# Patient Record
Sex: FEMALE | Race: WHITE | NOT HISPANIC OR LATINO | URBAN - METROPOLITAN AREA
[De-identification: names, ages, dates, MRNs, and addresses within clinical notes are randomized per-mention and may not be internally consistent; named-entity substitution may affect disease eponyms.]

---

## 2023-07-26 ENCOUNTER — OFFICE VISIT (OUTPATIENT)
Dept: URGENT CARE | Facility: CLINIC | Age: 23
End: 2023-07-26
Payer: OTHER GOVERNMENT

## 2023-07-26 VITALS
WEIGHT: 110 LBS | HEIGHT: 64 IN | RESPIRATION RATE: 16 BRPM | BODY MASS INDEX: 18.78 KG/M2 | SYSTOLIC BLOOD PRESSURE: 122 MMHG | TEMPERATURE: 98 F | HEART RATE: 89 BPM | OXYGEN SATURATION: 99 % | DIASTOLIC BLOOD PRESSURE: 88 MMHG

## 2023-07-26 DIAGNOSIS — R01.1 HEART MURMUR: ICD-10-CM

## 2023-07-26 DIAGNOSIS — R06.02 SHORTNESS OF BREATH: Primary | ICD-10-CM

## 2023-07-26 DIAGNOSIS — R42 LIGHTHEADEDNESS: ICD-10-CM

## 2023-07-26 DIAGNOSIS — R53.83 FATIGUE, UNSPECIFIED TYPE: ICD-10-CM

## 2023-07-26 LAB
ALBUMIN SERPL BCP-MCNC: 4.8 G/DL (ref 3.5–5.2)
ALP SERPL-CCNC: 54 U/L (ref 55–135)
ALT SERPL W/O P-5'-P-CCNC: 10 U/L (ref 10–44)
ANION GAP SERPL CALC-SCNC: 16 MMOL/L (ref 8–16)
AST SERPL-CCNC: 19 U/L (ref 10–40)
BASOPHILS # BLD AUTO: 0.04 K/UL (ref 0–0.2)
BASOPHILS NFR BLD: 0.4 % (ref 0–1.9)
BILIRUB SERPL-MCNC: 0.9 MG/DL (ref 0.1–1)
BUN SERPL-MCNC: 12 MG/DL (ref 6–20)
CALCIUM SERPL-MCNC: 9.5 MG/DL (ref 8.7–10.5)
CHLORIDE SERPL-SCNC: 103 MMOL/L (ref 95–110)
CO2 SERPL-SCNC: 20 MMOL/L (ref 23–29)
CREAT SERPL-MCNC: 0.8 MG/DL (ref 0.5–1.4)
DIFFERENTIAL METHOD: ABNORMAL
EOSINOPHIL # BLD AUTO: 0.1 K/UL (ref 0–0.5)
EOSINOPHIL NFR BLD: 0.5 % (ref 0–8)
ERYTHROCYTE [DISTWIDTH] IN BLOOD BY AUTOMATED COUNT: 11.9 % (ref 11.5–14.5)
EST. GFR  (NO RACE VARIABLE): >60 ML/MIN/1.73 M^2
GLUCOSE SERPL-MCNC: 79 MG/DL (ref 70–110)
HCT VFR BLD AUTO: 42 % (ref 37–48.5)
HGB BLD-MCNC: 15.2 G/DL (ref 12–16)
IMM GRANULOCYTES # BLD AUTO: 0.02 K/UL (ref 0–0.04)
IMM GRANULOCYTES NFR BLD AUTO: 0.2 % (ref 0–0.5)
LYMPHOCYTES # BLD AUTO: 2.1 K/UL (ref 1–4.8)
LYMPHOCYTES NFR BLD: 22.8 % (ref 18–48)
MCH RBC QN AUTO: 32.3 PG (ref 27–31)
MCHC RBC AUTO-ENTMCNC: 36.2 G/DL (ref 32–36)
MCV RBC AUTO: 89 FL (ref 82–98)
MONOCYTES # BLD AUTO: 0.6 K/UL (ref 0.3–1)
MONOCYTES NFR BLD: 6.6 % (ref 4–15)
NEUTROPHILS # BLD AUTO: 6.5 K/UL (ref 1.8–7.7)
NEUTROPHILS NFR BLD: 69.5 % (ref 38–73)
NRBC BLD-RTO: 0 /100 WBC
PLATELET # BLD AUTO: 296 K/UL (ref 150–450)
PMV BLD AUTO: 10.8 FL (ref 9.2–12.9)
POTASSIUM SERPL-SCNC: 3.3 MMOL/L (ref 3.5–5.1)
PROT SERPL-MCNC: 8.1 G/DL (ref 6–8.4)
RBC # BLD AUTO: 4.7 M/UL (ref 4–5.4)
SODIUM SERPL-SCNC: 139 MMOL/L (ref 136–145)
TSH SERPL DL<=0.005 MIU/L-ACNC: 1.56 UIU/ML (ref 0.4–4)
WBC # BLD AUTO: 9.37 K/UL (ref 3.9–12.7)

## 2023-07-26 PROCEDURE — 99214 OFFICE O/P EST MOD 30 MIN: CPT | Mod: S$GLB,,, | Performed by: FAMILY MEDICINE

## 2023-07-26 PROCEDURE — 93005 ELECTROCARDIOGRAM TRACING: CPT | Mod: S$GLB,,, | Performed by: FAMILY MEDICINE

## 2023-07-26 PROCEDURE — 36415 COLL VENOUS BLD VENIPUNCTURE: CPT | Performed by: FAMILY MEDICINE

## 2023-07-26 PROCEDURE — 85025 COMPLETE CBC W/AUTO DIFF WBC: CPT | Performed by: FAMILY MEDICINE

## 2023-07-26 PROCEDURE — 93010 ELECTROCARDIOGRAM REPORT: CPT | Mod: S$PBB,,, | Performed by: INTERNAL MEDICINE

## 2023-07-26 PROCEDURE — 93010 EKG 12-LEAD: ICD-10-PCS | Mod: S$PBB,,, | Performed by: INTERNAL MEDICINE

## 2023-07-26 PROCEDURE — 93005 EKG 12-LEAD: ICD-10-PCS | Mod: S$GLB,,, | Performed by: FAMILY MEDICINE

## 2023-07-26 PROCEDURE — 99214 PR OFFICE/OUTPT VISIT, EST, LEVL IV, 30-39 MIN: ICD-10-PCS | Mod: S$GLB,,, | Performed by: FAMILY MEDICINE

## 2023-07-26 PROCEDURE — 71046 XR CHEST PA AND LATERAL: ICD-10-PCS | Mod: S$GLB,,, | Performed by: RADIOLOGY

## 2023-07-26 PROCEDURE — 71046 X-RAY EXAM CHEST 2 VIEWS: CPT | Mod: S$GLB,,, | Performed by: RADIOLOGY

## 2023-07-26 PROCEDURE — 84443 ASSAY THYROID STIM HORMONE: CPT | Performed by: FAMILY MEDICINE

## 2023-07-26 PROCEDURE — 80053 COMPREHEN METABOLIC PANEL: CPT | Performed by: FAMILY MEDICINE

## 2023-07-26 NOTE — PROGRESS NOTES
"Subjective:      Patient ID: Rosalinda Walter is a 22 y.o. female.    Vitals:  height is 5' 4" (1.626 m) and weight is 49.9 kg (110 lb). Her oral temperature is 98.4 °F (36.9 °C). Her blood pressure is 132/89 and her pulse is 97. Her respiration is 16 and oxygen saturation is 99%.     Chief Complaint: Fatigue    Patient presents with c.o fatigue x 4 weeks or more. Patient went to a different  and was placed on Augmentin(told she might have an ear infection). She reports no improvement with med. She returned the urgent care and was placed on prednisone(even though ear wasn't red). She reports worsening of sxs and now has a headache. No fever. She is short of breath with minimal activity. 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 She has to stop going up the steps because she gets lightheaded and dizzy. She denies ear pain, sore throat, coughing, denies chest pain, denies abdominal pain. Denies dysuria or urine frequency or urgency. Denies change in her bowels , denies dark or tarry stools, denies fever or chills, denies heavy menses.     Fatigue  This is a new problem. Episode onset: 2 weeks. The problem occurs constantly. The problem has been unchanged. Associated symptoms include fatigue. Treatments tried: augmentin. The treatment provided no relief.     Constitution: Positive for fatigue.    Objective:     Physical Exam   Constitutional: She is oriented to person, place, and time. She appears well-developed. She is cooperative. She appears ill (patient appears fatigued). No distress.   HENT:   Head: Normocephalic and atraumatic.   Ears:   Right Ear: Hearing, tympanic membrane, external ear and ear canal normal.   Left Ear: Hearing, tympanic membrane, external ear and ear canal normal.   Nose: Nose normal. No mucosal edema or nasal deformity. No epistaxis. Right sinus exhibits no maxillary sinus tenderness and no frontal sinus tenderness. " Left sinus exhibits no maxillary sinus tenderness and no frontal sinus tenderness.   Mouth/Throat: Uvula is midline, oropharynx is clear and moist and mucous membranes are normal. Mucous membranes are moist. No trismus in the jaw. Normal dentition. No uvula swelling. Oropharynx is clear.   Eyes: Conjunctivae and lids are normal.   Neck: Trachea normal and phonation normal. Neck supple.   Cardiovascular: Normal rate, regular rhythm and normal pulses.   Murmur (gr 3/6 FELICITAS heard at right sternal border) heard.  Pulmonary/Chest: Effort normal and breath sounds normal.   Abdominal: Normal appearance and bowel sounds are normal. Soft.   Musculoskeletal: Normal range of motion.         General: Normal range of motion.   Lymphadenopathy:     She has no cervical adenopathy.   Neurological: no focal deficit. She is alert and oriented to person, place, and time. She displays weakness (generalized). No cranial nerve deficit. She exhibits normal muscle tone. Coordination normal.   Skin: Skin is warm, dry, intact, pale and no rash. Capillary refill takes less than 2 seconds.   Psychiatric: Her speech is normal and behavior is normal. Judgment and thought content normal.   Nursing note and vitals reviewed.    Assessment:     1. Shortness of breath    2. Lightheadedness    3. Heart murmur    4. Fatigue, unspecified type        Plan:   EKG - no acute findings, NSR  Chest xray- no acute findings    Shortness of breath  -     IN OFFICE EKG 12-LEAD (to [place])  -     XR CHEST PA AND LATERAL; Future; Expected date: 07/26/2023  -     CBC Auto Differential  -     COMPREHENSIVE METABOLIC PANEL  -     TSH  -     Ambulatory referral/consult to Internal Medicine    Lightheadedness  -     Ambulatory referral/consult to Internal Medicine    Heart murmur  -     Echo Saline Bubble? No; Future  -     Ambulatory referral/consult to Internal Medicine    Fatigue, unspecified type  -     Ambulatory referral/consult to Internal Medicine      I advised pt  I will provide a note to be off work until we can get her labs done. If those are normal I recommend she follow with Internal Medicine specialist for further testing if symptoms persist.     Pt or guardian provided educational materials and instructions regarding their visit diagnosis.

## 2023-07-26 NOTE — PATIENT INSTRUCTIONS
Rest, Increase fluids,   I provided a note to be off work until we can get her labs done.   If those are normal I recommend you  follow with Internal Medicine specialist for further testing if symptoms persist.

## 2023-07-26 NOTE — LETTER
Urgent Care - [facility]  Urgent Care  [address] [place] [id number]  Phone: [phone]  Fax: [phone] July 26, 2023    Patient: Rosalinda Walter   Patient ID [id number]   YOB: 2000   Date of Visit: 7/26/2023       To Whom It May Concern:    Rosalinda Walter was seen and treated in our emergency department on 7/26/2023 for shortness of breath. She may return to work on 07/31/23 .     Sincerely,       Angelika Lorenzo, DO

## 2023-07-28 ENCOUNTER — TELEPHONE (OUTPATIENT)
Dept: URGENT CARE | Facility: CLINIC | Age: 23
End: 2023-07-28
Payer: OTHER GOVERNMENT

## 2023-07-28 NOTE — TELEPHONE ENCOUNTER
No answer. Left message on answering machine for pt to call back for test results. Labs mostly normal- just minor signs of dehydration and possible hyperventilation. Her CBC was ok and tsh ok. I do believe she should follow with PCP to get established and for further testing if symptoms persist. I explained I dont see an obvious reason on her labs that explain her symptoms. If she feels worse she should go in to the ED. If she is stable but just isnt better than rtc here if symptoms persist. She should follow with PCP as that is scheduled and for echo when she can

## 2023-07-28 NOTE — TELEPHONE ENCOUNTER
I spoke with patient today regarding her lab results. They did show she has a mildly low potassium (pt denies nausea or vomiting or taking diuretics) and a mildly low CO2. She denies taking any meds. She is still feeling lightheaded and fatigued. I had referred her to an Internal Medicine specialist but(the appointment is not till the end of August) and  I realize her insurance is  and she may have to go to a specific provider and/or get a referral from them in order for them to cover the echocardiogram or additional testing.. I recommend she check with  about those rules. I also explained that if she feels like symptoms are worsening I advise her to go the the ED where they can do more extensive testing than we can in the UC. Pt agrees to this plan

## 2023-07-28 NOTE — TELEPHONE ENCOUNTER
Potassium is a bit low and I would recommend a high potassium food diet( lots of dried fruit, bananas, avacodo's, etc

## 2023-08-17 ENCOUNTER — OFFICE VISIT (OUTPATIENT)
Dept: INTERNAL MEDICINE | Facility: CLINIC | Age: 23
End: 2023-08-17
Payer: OTHER GOVERNMENT

## 2023-08-17 ENCOUNTER — LAB VISIT (OUTPATIENT)
Dept: LAB | Facility: HOSPITAL | Age: 23
End: 2023-08-17
Payer: OTHER GOVERNMENT

## 2023-08-17 VITALS
DIASTOLIC BLOOD PRESSURE: 82 MMHG | WEIGHT: 111.13 LBS | HEART RATE: 108 BPM | HEIGHT: 64 IN | SYSTOLIC BLOOD PRESSURE: 124 MMHG | BODY MASS INDEX: 18.97 KG/M2 | OXYGEN SATURATION: 100 %

## 2023-08-17 DIAGNOSIS — R42 DIZZINESS: ICD-10-CM

## 2023-08-17 DIAGNOSIS — R42 DIZZINESS: Primary | ICD-10-CM

## 2023-08-17 DIAGNOSIS — R00.2 PALPITATIONS: ICD-10-CM

## 2023-08-17 LAB
ANION GAP SERPL CALC-SCNC: 8 MMOL/L (ref 8–16)
BUN SERPL-MCNC: 13 MG/DL (ref 6–20)
CALCIUM SERPL-MCNC: 9.2 MG/DL (ref 8.7–10.5)
CHLORIDE SERPL-SCNC: 106 MMOL/L (ref 95–110)
CO2 SERPL-SCNC: 24 MMOL/L (ref 23–29)
CREAT SERPL-MCNC: 0.8 MG/DL (ref 0.5–1.4)
EST. GFR  (NO RACE VARIABLE): >60 ML/MIN/1.73 M^2
GLUCOSE SERPL-MCNC: 87 MG/DL (ref 70–110)
POTASSIUM SERPL-SCNC: 4.1 MMOL/L (ref 3.5–5.1)
SODIUM SERPL-SCNC: 138 MMOL/L (ref 136–145)

## 2023-08-17 PROCEDURE — 99203 OFFICE O/P NEW LOW 30 MIN: CPT | Mod: S$PBB,,,

## 2023-08-17 PROCEDURE — 99999 PR PBB SHADOW E&M-EST. PATIENT-LVL III: CPT | Mod: PBBFAC,,,

## 2023-08-17 PROCEDURE — 99203 PR OFFICE/OUTPT VISIT, NEW, LEVL III, 30-44 MIN: ICD-10-PCS | Mod: S$PBB,,,

## 2023-08-17 PROCEDURE — 99999 PR PBB SHADOW E&M-EST. PATIENT-LVL III: ICD-10-PCS | Mod: PBBFAC,,,

## 2023-08-17 PROCEDURE — 99213 OFFICE O/P EST LOW 20 MIN: CPT | Mod: PBBFAC

## 2023-08-17 PROCEDURE — 80048 BASIC METABOLIC PNL TOTAL CA: CPT

## 2023-08-17 PROCEDURE — 36415 COLL VENOUS BLD VENIPUNCTURE: CPT

## 2023-08-17 RX ORDER — SERTRALINE HYDROCHLORIDE 25 MG/1
25 TABLET, FILM COATED ORAL DAILY
Qty: 90 TABLET | Refills: 0 | Status: SHIPPED | OUTPATIENT
Start: 2023-08-17 | End: 2023-11-13

## 2023-08-17 NOTE — PROGRESS NOTES
Subjective     Chief Complaint: dizziness    History of Present Illness:  Ms. Rosalinda Walter is a 23 y.o. female who presented to clinic for dizziness. Patient states symptoms have been going on for 2 months and occur around 2 times a day. 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 Patient went to  at that time and had some associated head pressure and was empirically treated for antibiotics for presumed ear infection. Patient denies any more head pressure but symptoms continued to persist and worsen for which she went to  again at [facility] where she got labs and EKG. EKG showed normal sinus rhythm but labs showed some hypokalemia and acidosis. She felt symptoms again 2 weeks ago and went to [facility] and CT head and workup at that time was negative.     She drinks plenty of water. She denies any recreational drug use or alcohol. She denies any coffee or excess caffeine consumption. Patient denies taking any other medications or herbal supplements.     Review of Systems   Constitutional:  Negative for chills and fever.   HENT:  Negative for sore throat.    Respiratory:  Negative for shortness of breath, wheezing and stridor.    Cardiovascular:  Positive for palpitations. Negative for chest pain and leg swelling.   Gastrointestinal:  Negative for abdominal pain, blood in stool, constipation, diarrhea, nausea and vomiting.   Genitourinary:  Negative for dysuria, flank pain, frequency and hematuria.   Musculoskeletal:  Negative for back pain, joint pain and neck pain.   Neurological:  Positive for dizziness. Negative for focal weakness, weakness and headaches.   Endo/Heme/Allergies:  Does not bruise/bleed easily.   Psychiatric/Behavioral:  Negative for memory loss. The patient does not have insomnia.      PAST HISTORY:     History reviewed. No pertinent past medical history.    History reviewed. No pertinent surgical  "history.    History reviewed. No pertinent family history.    Social History     Socioeconomic History    Marital status:    Tobacco Use    Smoking status: Never    Smokeless tobacco: Never   Substance and Sexual Activity    Alcohol use: Not Currently       MEDICATIONS & ALLERGIES:     No current outpatient medications on file prior to visit.     No current facility-administered medications on file prior to visit.       Review of patient's allergies indicates:  No Known Allergies    OBJECTIVE:     Vital Signs:  Vitals:    08/17/23 1354   BP: 124/82   BP Location: Left arm   Patient Position: Sitting   BP Method: Medium (Manual)   Pulse: 108   SpO2: 100%   Weight: 50.4 kg (111 lb 1.8 oz)   Height: 5' 4" (1.626 m)       Body mass index is 19.07 kg/m².     Physical Exam  Vitals and nursing note reviewed.   Constitutional:       General: She is not in acute distress.     Appearance: Normal appearance. She is not ill-appearing.   HENT:      Head: Normocephalic and atraumatic.      Right Ear: External ear normal.      Left Ear: External ear normal.      Nose: Nose normal. No rhinorrhea.      Mouth/Throat:      Mouth: Mucous membranes are moist.      Pharynx: Oropharynx is clear.   Eyes:      General:         Right eye: No discharge.         Left eye: No discharge.      Extraocular Movements: Extraocular movements intact.      Conjunctiva/sclera: Conjunctivae normal.   Cardiovascular:      Rate and Rhythm: Regular rhythm. Tachycardia present.      Heart sounds: No murmur heard.  Pulmonary:      Effort: Pulmonary effort is normal. No respiratory distress.      Breath sounds: Normal breath sounds. No wheezing or rales.   Musculoskeletal:         General: No swelling. Normal range of motion.      Cervical back: Normal range of motion and neck supple.      Right lower leg: No edema.      Left lower leg: No edema.   Skin:     General: Skin is warm and dry.      Coloration: Skin is not jaundiced or pale.   Neurological: " "     General: No focal deficit present.      Mental Status: She is alert and oriented to person, place, and time.      Motor: No weakness.      Gait: Gait normal.   Psychiatric:         Mood and Affect: Mood normal.         Behavior: Behavior normal.         Thought Content: Thought content normal.     Laboratory  Lab Results   Component Value Date    WBC 9.37 07/26/2023    HGB 15.2 07/26/2023    HCT 42.0 07/26/2023    MCV 89 07/26/2023     07/26/2023     @[id number](GLU,NA,K,Cl,CO2,BUN,Creatinine,Calcium,MG)@  No results found for: "INR", "PROTIME"  No results found for: "HGBA1C"  No results for input(s): "POCTGLUCOSE" in the last 72 hours.    Diagnostic Results:  Labs: Reviewed  CT: Reviewed    ASSESSMENT & PLAN:   Ms. Rosalinda aWlter is a 23 y.o. female who presents today with   Rosalinda was seen today for follow-up.    Diagnoses and all orders for this visit:    Dizziness  Unclear cause at this time for dizziness. Will complete cardiac work up with f/u ordered echo and will order holter monitor this time. Will concomitantly start low dose sertraline and behavioral health referral and will follow up in 4 weeks to reassess. Orthostatic pressures negative. All patient questions answered.   -     BASIC METABOLIC PANEL; Future  -     Cardiac Monitor - 3-15 Day Adult ([facility] Only); Future  -     Ambulatory referral/consult to Behavioral Health; Future    Palpitations  -     Cardiac Monitor - 3-15 Day Adult ([facility] Only); Future  -     Ambulatory referral/consult to Behavioral Health; Future    Other orders  -     sertraline (ZOLOFT) 25 MG tablet; Take 1 tablet (25 mg total) by mouth once daily.        RTC in 1 month    Case discussed with Dr Philly Morse MD  Internal Medicine PGY3  [facility]  [address] [place] [id number] [phone]      "

## 2023-08-21 ENCOUNTER — CLINICAL SUPPORT (OUTPATIENT)
Dept: CARDIOLOGY | Facility: HOSPITAL | Age: 23
End: 2023-08-21
Payer: OTHER GOVERNMENT

## 2023-08-21 DIAGNOSIS — R00.2 PALPITATIONS: ICD-10-CM

## 2023-08-21 DIAGNOSIS — R42 DIZZINESS: ICD-10-CM

## 2023-08-21 PROCEDURE — 93272 CARDIAC EVENT MONITOR (CUPID ONLY): ICD-10-PCS | Mod: ,,, | Performed by: INTERNAL MEDICINE

## 2023-08-21 PROCEDURE — 93270 REMOTE 30 DAY ECG REV/REPORT: CPT

## 2023-08-21 PROCEDURE — 93272 ECG/REVIEW INTERPRET ONLY: CPT | Mod: ,,, | Performed by: INTERNAL MEDICINE

## 2023-08-21 PROCEDURE — 93271 ECG/MONITORING AND ANALYSIS: CPT

## 2023-08-28 ENCOUNTER — OFFICE VISIT (OUTPATIENT)
Dept: URGENT CARE | Facility: CLINIC | Age: 23
End: 2023-08-28
Payer: OTHER GOVERNMENT

## 2023-08-28 VITALS
BODY MASS INDEX: 18.78 KG/M2 | SYSTOLIC BLOOD PRESSURE: 122 MMHG | OXYGEN SATURATION: 98 % | WEIGHT: 110 LBS | TEMPERATURE: 99 F | RESPIRATION RATE: 18 BRPM | DIASTOLIC BLOOD PRESSURE: 79 MMHG | HEART RATE: 81 BPM | HEIGHT: 64 IN

## 2023-08-28 DIAGNOSIS — N30.01 ACUTE CYSTITIS WITH HEMATURIA: Primary | ICD-10-CM

## 2023-08-28 LAB
B-HCG UR QL: NEGATIVE
BILIRUB UR QL STRIP: POSITIVE
CTP QC/QA: YES
GLUCOSE UR QL STRIP: NEGATIVE
KETONES UR QL STRIP: NEGATIVE
LEUKOCYTE ESTERASE UR QL STRIP: POSITIVE
PH, POC UA: 5.5 (ref 5–8)
POC BLOOD, URINE: POSITIVE
POC NITRATES, URINE: POSITIVE
PROT UR QL STRIP: POSITIVE
SP GR UR STRIP: 1.02 (ref 1–1.03)
UROBILINOGEN UR STRIP-ACNC: ABNORMAL (ref 0.1–1.1)

## 2023-08-28 PROCEDURE — 81003 URINALYSIS AUTO W/O SCOPE: CPT | Mod: QW,S$GLB,, | Performed by: PHYSICIAN ASSISTANT

## 2023-08-28 PROCEDURE — 81025 POCT URINE PREGNANCY: ICD-10-PCS | Mod: S$GLB,,, | Performed by: PHYSICIAN ASSISTANT

## 2023-08-28 PROCEDURE — 99213 OFFICE O/P EST LOW 20 MIN: CPT | Mod: S$GLB,,, | Performed by: PHYSICIAN ASSISTANT

## 2023-08-28 PROCEDURE — 81025 URINE PREGNANCY TEST: CPT | Mod: S$GLB,,, | Performed by: PHYSICIAN ASSISTANT

## 2023-08-28 PROCEDURE — 81003 POCT URINALYSIS, DIPSTICK, AUTOMATED, W/O SCOPE: ICD-10-PCS | Mod: QW,S$GLB,, | Performed by: PHYSICIAN ASSISTANT

## 2023-08-28 PROCEDURE — 99213 PR OFFICE/OUTPT VISIT, EST, LEVL III, 20-29 MIN: ICD-10-PCS | Mod: S$GLB,,, | Performed by: PHYSICIAN ASSISTANT

## 2023-08-28 RX ORDER — NITROFURANTOIN 25; 75 MG/1; MG/1
100 CAPSULE ORAL 2 TIMES DAILY
Qty: 10 CAPSULE | Refills: 0 | Status: SHIPPED | OUTPATIENT
Start: 2023-08-28 | End: 2023-09-02

## 2023-08-28 NOTE — PATIENT INSTRUCTIONS
- Rest.    - Drink plenty of fluids.    - Acetaminophen (tylenol) or Ibuprofen (advil,motrin) as directed as needed for fever/pain. Avoid tylenol if you have a history of liver disease. Do not take ibuprofen if you have a history of GI bleeding, kidney disease, or if you take blood thinners.     - You have been given an antibiotic to treat your condition today.    - Please complete the antibiotic as directed on the bottle.   - If you are female and on oral birth control pills, use additional methods to prevent pregnancy while on antibiotics and for one cycle after.   - you can take otc probiotic to limit upset stomach    - You can take over the counter AZO as directed for discomfort with urination. This may cause your urine to turn orange/red color.    - Follow up with your PCP or specialty clinic as directed in the next 1-2 weeks if not improved or as needed.  You can call [phone] to schedule an appointment with the appropriate provider.    - Go to the ER or seek medical attention immediately if you develop new or worsening symptoms.     - You must understand that you have received an Urgent Care treatment only and that you may be released before all of your medical problems are known or treated.   - You, the patient, will arrange for follow up care as instructed.   - If your condition worsens or fails to improve we recommend that you receive another evaluation at the ER immediately or contact your PCP to discuss your concerns or return here.

## 2023-08-28 NOTE — PROGRESS NOTES
"Subjective:      Patient ID: Rosalinda Walter is a 23 y.o. female.    Vitals:  height is 5' 4" (1.626 m) and weight is 49.9 kg (110 lb). Her temperature is 98.8 °F (37.1 °C). Her blood pressure is 122/79 and her pulse is 81. Her respiration is 18 and oxygen saturation is 98%.     Chief Complaint: Urinary Urgency    Patient presents today with chief complaint of dysuria, frequency, urgency, hematuria, and suprapubic pain.  Symptoms began yesterday and worsened since onset.  Last menstrual period 1 week ago.  No vaginal discharge or bleeding.  No fever, vomiting, or flank pain.  Has taken azo for symptoms.    Urinary Tract Infection   This is a new problem. The current episode started in the past 7 days. The problem has been gradually worsening. The quality of the pain is described as aching and burning. She is Sexually active. Associated symptoms include frequency and urgency. Pertinent negatives include no chills, flank pain, hematuria, hesitancy, nausea, possible pregnancy, vomiting or rash. Treatments tried: azo.       Constitution: Negative for chills, sweating, fatigue and fever.   HENT:  Negative for congestion and sore throat.    Neck: Negative for neck pain and neck stiffness.   Cardiovascular:  Negative for chest pain, leg swelling and palpitations.   Eyes:  Negative for eye itching, eye pain and eye redness.   Respiratory:  Negative for cough, sputum production and shortness of breath.    Gastrointestinal:  Negative for abdominal pain, nausea, vomiting and diarrhea.   Genitourinary:  Positive for dysuria, frequency, urgency and pelvic pain (suprapubic discomfort). Negative for flank pain and hematuria.   Musculoskeletal:  Negative for pain and joint swelling.   Skin:  Negative for color change and rash.   Neurological:  Negative for dizziness, headaches, disorientation, altered mental status, numbness and tingling.   Psychiatric/Behavioral:  Negative for altered mental status and disorientation.     "   Objective:     Physical Exam   Constitutional: She is oriented to person, place, and time. She appears well-developed.  Non-toxic appearance. She does not appear ill. No distress.   HENT:   Head: Normocephalic and atraumatic.   Ears:   Right Ear: External ear normal.   Left Ear: External ear normal.   Nose: Nose normal.   Mouth/Throat: Mucous membranes are normal.   Eyes: Conjunctivae and lids are normal.   Neck: Trachea normal. Neck supple.   Cardiovascular: Normal rate, regular rhythm and normal heart sounds.   Pulmonary/Chest: Effort normal and breath sounds normal. No accessory muscle usage or stridor. No tachypnea and no bradypnea. No respiratory distress. She has no decreased breath sounds. She has no wheezes. She has no rhonchi. She has no rales.   Abdominal: Normal appearance and bowel sounds are normal. She exhibits no distension and no mass. Soft. There is abdominal tenderness in the suprapubic area. There is no rebound, no guarding, no left CVA tenderness and no right CVA tenderness.      Comments: Abdomen soft, there is localized TTP of midline suprapubic area directly over bladder.  Rest of abdomen non TTP. No cva ttp.    Musculoskeletal: Normal range of motion.         General: Normal range of motion.   Neurological: She is alert and oriented to person, place, and time. She has normal strength.   Skin: Skin is warm, dry, intact, not diaphoretic and not pale.   Psychiatric: Her speech is normal and behavior is normal. Judgment and thought content normal.   Nursing note and vitals reviewed.      Results for orders placed or performed in visit on 08/28/23   POCT urine pregnancy   Result Value Ref Range    POC Preg Test, Ur Negative Negative     Acceptable Yes    POCT Urinalysis, Dipstick, Automated, W/O Scope   Result Value Ref Range    POC Blood, Urine Positive (A) Negative    POC Bilirubin, Urine Positive (A) Negative    POC Urobilinogen, Urine 4.0 mg/dL 0.1 - 1.1    POC Ketones, Urine  Negative Negative    POC Protein, Urine Positive (A) Negative    POC Nitrates, Urine Positive (A) Negative    POC Glucose, Urine Negative Negative    pH, UA 5.5 5 - 8    POC Specific Gravity, Urine 1.025 1.003 - 1.029    POC Leukocytes, Urine Positive (A) Negative     UA with blood, nit, leuk.  Urine color is red/orange from azo, likely skewing urinalysis results, as discussed with patient.  Assessment:     1. Acute cystitis with hematuria        Plan:     - Discussed ddx, home care, tx options, and given follow up precautions.  I have reviewed the patient's chart to view previous visits, labs, and imaging to assess PMH and look for any trends or previous treatments.    Acute cystitis with hematuria  -     POCT urine pregnancy  -     POCT Urinalysis, Dipstick, Automated, W/O Scope  -     nitrofurantoin, macrocrystal-monohydrate, (MACROBID) 100 MG capsule; Take 1 capsule (100 mg total) by mouth 2 (two) times daily. for 5 days  Dispense: 10 capsule; Refill: 0      Patient Instructions   - Rest.    - Drink plenty of fluids.    - Acetaminophen (tylenol) or Ibuprofen (advil,motrin) as directed as needed for fever/pain. Avoid tylenol if you have a history of liver disease. Do not take ibuprofen if you have a history of GI bleeding, kidney disease, or if you take blood thinners.     - You have been given an antibiotic to treat your condition today.    - Please complete the antibiotic as directed on the bottle.   - If you are female and on oral birth control pills, use additional methods to prevent pregnancy while on antibiotics and for one cycle after.   - you can take otc probiotic to limit upset stomach    - You can take over the counter AZO as directed for discomfort with urination. This may cause your urine to turn orange/red color.    - Follow up with your PCP or specialty clinic as directed in the next 1-2 weeks if not improved or as needed.  You can call [phone] to schedule an appointment with the appropriate  provider.    - Go to the ER or seek medical attention immediately if you develop new or worsening symptoms.     - You must understand that you have received an Urgent Care treatment only and that you may be released before all of your medical problems are known or treated.   - You, the patient, will arrange for follow up care as instructed.   - If your condition worsens or fails to improve we recommend that you receive another evaluation at the ER immediately or contact your PCP to discuss your concerns or return here.

## 2023-09-13 ENCOUNTER — PATIENT MESSAGE (OUTPATIENT)
Dept: INTERNAL MEDICINE | Facility: CLINIC | Age: 23
End: 2023-09-13
Payer: OTHER GOVERNMENT

## 2023-09-14 DIAGNOSIS — R42 DIZZINESS: Primary | ICD-10-CM

## 2023-09-15 ENCOUNTER — PATIENT MESSAGE (OUTPATIENT)
Dept: OTOLARYNGOLOGY | Facility: CLINIC | Age: 23
End: 2023-09-15
Payer: OTHER GOVERNMENT

## 2023-09-18 ENCOUNTER — OFFICE VISIT (OUTPATIENT)
Dept: OTOLARYNGOLOGY | Facility: CLINIC | Age: 23
End: 2023-09-18
Payer: OTHER GOVERNMENT

## 2023-09-18 VITALS — HEIGHT: 64 IN | WEIGHT: 110 LBS | BODY MASS INDEX: 18.78 KG/M2

## 2023-09-18 DIAGNOSIS — R42 DIZZINESS: ICD-10-CM

## 2023-09-18 DIAGNOSIS — S16.1XXA NECK MUSCLE STRAIN, INITIAL ENCOUNTER: Primary | ICD-10-CM

## 2023-09-18 PROCEDURE — 99203 PR OFFICE/OUTPT VISIT, NEW, LEVL III, 30-44 MIN: ICD-10-PCS | Mod: S$PBB,,, | Performed by: OTOLARYNGOLOGY

## 2023-09-18 PROCEDURE — 99999 PR PBB SHADOW E&M-EST. PATIENT-LVL III: CPT | Mod: PBBFAC,,, | Performed by: OTOLARYNGOLOGY

## 2023-09-18 PROCEDURE — 99999 PR PBB SHADOW E&M-EST. PATIENT-LVL III: ICD-10-PCS | Mod: PBBFAC,,, | Performed by: OTOLARYNGOLOGY

## 2023-09-18 PROCEDURE — 99213 OFFICE O/P EST LOW 20 MIN: CPT | Mod: PBBFAC,PO | Performed by: OTOLARYNGOLOGY

## 2023-09-18 PROCEDURE — 99203 OFFICE O/P NEW LOW 30 MIN: CPT | Mod: S$PBB,,, | Performed by: OTOLARYNGOLOGY

## 2023-09-18 NOTE — PROGRESS NOTES
Subjective:       Patient ID: Rosalinda Walter is a 23 y.o. female.    Chief Complaint: Consult (R ear pops ) and Sinus Problem (Head pressure since June )    Rosalinda is here for dizziness.   Length of symptoms: months.  She was dx with OM when this began and did not help with treatment.  She endorses a pressure in the head in the retroauricular region which initially was right sided but not can occur bilaterally.  She also has been having intermittent dizziness with spinning sensation, brief in nature.   She endorses a popping sensation in the right ear.  Denies recent trauma.     Denies TMJ issues.       Patient validated questionnaires (if applicable):      %            No data to display                   No data to display                   No data to display                     Social History     Tobacco Use   Smoking Status Never   Smokeless Tobacco Never     Social History     Substance and Sexual Activity   Alcohol Use Not Currently          Objective:        Constitutional:   She is oriented to person, place, and time. She appears well-developed and well-nourished. She appears alert. She is active. Normal speech.      Head:  Normocephalic and atraumatic. Head is without TMJ tenderness. No scars. Salivary glands normal.  Facial strength is normal.    Bilateral tenderness at SCM insertion to mastoid tip, base of skull, Erb's point     Ears:    Right Ear: No drainage or swelling. No middle ear effusion.   Left Ear: No drainage or swelling.  No middle ear effusion.     Nose:  No mucosal edema, rhinorrhea or sinus tenderness. No turbinate hypertrophy.      Mouth/Throat  Oropharynx clear and moist without lesions or asymmetry, normal uvula midline and mirror exam normal. Normal dentition. No uvula swelling, lacerations or trismus. No oropharyngeal exudate. Tonsillar erythema, tonsillar exudate.      Neck:  Full range of motion with neck supple and no adenopathy. Thyroid tenderness is present. No tracheal deviation,  no edema, no erythema, normal range of motion, no stridor, no crepitus and no neck rigidity present. No thyroid mass present.     Cardiovascular:    Intact distal pulses and normal pulses.              Pulmonary/Chest:   Effort normal and breath sounds normal. No stridor.     Psychiatric:   Her speech is normal and behavior is normal. Her mood appears not anxious. Her affect is not labile.     Neurological:   She is alert and oriented to person, place, and time. No sensory deficit.     Skin:   No abrasions, lacerations, lesions, or rashes. No abrasion and no bruising noted.         Tests / Results:  CT Head 7/2023:  TECHNIQUE:  CT images from skull base to vertex without IV contrast. This CT was performed utilizing one or more of the following dose lowering techniques: automated exposure control, iterative reconstruction technique and/or adjustment of the mA and kVP according to patient size.     COMPARISON: None.     FINDINGS:   Parenchyma: No acute infarction. No hemorrhage. No midline shift or herniation.     Extra-axial Collection:  None     Ventricular System:  Normal     Dural Venous Sinuses:  Normal     Osseous/Soft Tissue Structures:  Normal     Included Orbits: Normal     Paranasal Sinuses:  Predominantly clear     Tympanomastoid Cavities:  Normal     Assessment:       1. Neck muscle strain, initial encounter    2. Dizziness          Plan:         Ears normal  Discussed suspected MSK cause - recommend PT  Can consider dedicated imaging if persistent

## 2023-09-26 ENCOUNTER — OFFICE VISIT (OUTPATIENT)
Dept: INTERNAL MEDICINE | Facility: CLINIC | Age: 23
End: 2023-09-26
Payer: OTHER GOVERNMENT

## 2023-09-26 VITALS
HEART RATE: 93 BPM | BODY MASS INDEX: 19 KG/M2 | DIASTOLIC BLOOD PRESSURE: 88 MMHG | WEIGHT: 111.31 LBS | SYSTOLIC BLOOD PRESSURE: 115 MMHG | HEIGHT: 64 IN

## 2023-09-26 DIAGNOSIS — R42 DIZZINESS: Primary | ICD-10-CM

## 2023-09-26 PROCEDURE — 99999 PR PBB SHADOW E&M-EST. PATIENT-LVL III: ICD-10-PCS | Mod: PBBFAC,,,

## 2023-09-26 PROCEDURE — 99212 PR OFFICE/OUTPT VISIT, EST, LEVL II, 10-19 MIN: ICD-10-PCS | Mod: S$PBB,,,

## 2023-09-26 PROCEDURE — 99212 OFFICE O/P EST SF 10 MIN: CPT | Mod: S$PBB,,,

## 2023-09-26 PROCEDURE — 99999 PR PBB SHADOW E&M-EST. PATIENT-LVL III: CPT | Mod: PBBFAC,,,

## 2023-09-26 PROCEDURE — 99213 OFFICE O/P EST LOW 20 MIN: CPT | Mod: PBBFAC

## 2023-09-26 RX ORDER — BUSPIRONE HYDROCHLORIDE 10 MG/1
10 TABLET ORAL 2 TIMES DAILY
Qty: 180 TABLET | Refills: 0 | Status: SHIPPED | OUTPATIENT
Start: 2023-09-26 | End: 2023-11-13

## 2023-09-26 NOTE — PROGRESS NOTES
Subjective     Chief Complaint:    History of Present Illness:  Ms. Rosalinda Walter is a 23 y.o. female who presents to clinic for follow up for her dizziness. Patient was seen prior in clinic and was worked up for dizziness. Cardiac workup was negative so far. Patient still has not had echo done. Patient noticed some head pressure which is new. Patient was treated prior with antibiotics for presumed sinusitis. Patient discussed with psychiatry and did not take her SSRI. Patient also saw ENT and was noted to have neck strain at that time and to follow up again if symptoms persist. Patient states that symptoms are still persistent.     Review of Systems   Constitutional:  Negative for chills and fever.   HENT:  Positive for sinus pain. Negative for hearing loss and sore throat.    Eyes:  Negative for discharge.   Respiratory:  Negative for shortness of breath, wheezing and stridor.    Cardiovascular:  Negative for chest pain, palpitations and leg swelling.   Gastrointestinal:  Negative for abdominal pain, blood in stool, constipation, diarrhea, nausea and vomiting.   Genitourinary:  Negative for dysuria, flank pain, frequency and hematuria.   Musculoskeletal:  Negative for back pain, joint pain and neck pain.   Neurological:  Positive for dizziness. Negative for focal weakness, weakness and headaches.   Endo/Heme/Allergies:  Does not bruise/bleed easily.   Psychiatric/Behavioral:  Negative for memory loss. The patient does not have insomnia.        PAST HISTORY:     No past medical history on file.    No past surgical history on file.    No family history on file.    Social History     Socioeconomic History    Marital status:    Tobacco Use    Smoking status: Never    Smokeless tobacco: Never   Substance and Sexual Activity    Alcohol use: Not Currently       MEDICATIONS & ALLERGIES:     Current Outpatient Medications on File Prior to Visit   Medication Sig    sertraline (ZOLOFT) 25 MG tablet Take 1 tablet (25  "mg total) by mouth once daily. (Patient not taking: Reported on 9/18/2023)     No current facility-administered medications on file prior to visit.       Review of patient's allergies indicates:  No Known Allergies    OBJECTIVE:     Vital Signs:  Vitals:    09/26/23 1524   BP: 115/88   Pulse: 93   Weight: 50.5 kg (111 lb 5.3 oz)   Height: 5' 4" (1.626 m)       Body mass index is 19.11 kg/m².     Physical Exam  Vitals and nursing note reviewed.   Constitutional:       General: She is not in acute distress.     Appearance: Normal appearance. She is not ill-appearing.   HENT:      Head: Normocephalic and atraumatic.      Right Ear: External ear normal.      Left Ear: External ear normal.      Nose: Nose normal. No rhinorrhea.      Mouth/Throat:      Mouth: Mucous membranes are moist.      Pharynx: Oropharynx is clear.   Eyes:      General:         Right eye: No discharge.         Left eye: No discharge.      Extraocular Movements: Extraocular movements intact.      Conjunctiva/sclera: Conjunctivae normal.   Cardiovascular:      Rate and Rhythm: Normal rate and regular rhythm.      Heart sounds: No murmur heard.  Pulmonary:      Effort: Pulmonary effort is normal. No respiratory distress.      Breath sounds: Normal breath sounds. No wheezing or rales.   Musculoskeletal:         General: No swelling. Normal range of motion.      Cervical back: Normal range of motion and neck supple.      Right lower leg: No edema.      Left lower leg: No edema.   Skin:     General: Skin is warm and dry.      Coloration: Skin is not jaundiced or pale.   Neurological:      General: No focal deficit present.      Mental Status: She is alert and oriented to person, place, and time.      Motor: No weakness.      Gait: Gait normal.   Psychiatric:         Mood and Affect: Mood normal.         Behavior: Behavior normal.         Thought Content: Thought content normal.         Laboratory  Lab Results   Component Value Date    WBC 9.37 07/26/2023 " "   HGB 15.2 07/26/2023    HCT 42.0 07/26/2023    MCV 89 07/26/2023     07/26/2023     @[id number](GLU,NA,K,Cl,CO2,BUN,Creatinine,Calcium,MG)@  No results found for: "INR", "PROTIME"  No results found for: "HGBA1C"  No results for input(s): "POCTGLUCOSE" in the last 72 hours.    Diagnostic Results:  Labs: Reviewed  ECG: Reviewed    ASSESSMENT & PLAN:   Ms. Rosalinda Walter is a 23 y.o. female who presents today with   Rosalinda was seen today for follow-up.    Diagnoses and all orders for this visit:    Dizziness  Will have patient evaluated for venous insufficiency. Will also have patient see PT for possible vestibular therapy. Advised patient to take buspar and see if it helps with symptoms and patient verbalized understanding. If further workup is negative, will have patient f/u with ENT again.   -     CV US Lower Extremity Veins Bilateral Insufficiency; Future  -     Ambulatory referral/consult to Physical/Occupational Therapy; Future    Other orders  -     busPIRone (BUSPAR) 10 MG tablet; Take 1 tablet (10 mg total) by mouth 2 (two) times daily            RTC in 3 months    Case discussed with Dr Manasa MATHEWS  Internal Medicine PGY3  [facility]  [address] [place] [id number] [phone]    I have discussed A/P with DR Morse and agree with plan of action.  Helena Morales.   "

## 2023-09-28 ENCOUNTER — HOSPITAL ENCOUNTER (OUTPATIENT)
Dept: CARDIOLOGY | Facility: HOSPITAL | Age: 23
Discharge: HOME | End: 2023-09-28
Payer: OTHER GOVERNMENT

## 2023-09-28 DIAGNOSIS — R42 DIZZINESS: ICD-10-CM

## 2023-09-28 LAB
LEFT GREAT SAPHENOUS DISTAL THIGH DIA: 0.23 CM
LEFT GREAT SAPHENOUS JUNCTION DIA: 0.41 CM
LEFT GREAT SAPHENOUS KNEE DIA: 0.23 CM
LEFT GREAT SAPHENOUS MIDDLE THIGH DIA: 0.23 CM
LEFT GREAT SAPHENOUS PROXIMAL CALF DIA: 0.11 CM
LEFT SMALL SAPHENOUS KNEE DIA: 0.3 CM
LEFT SMALL SAPHENOUS SPJ DIA: 0.14 CM
RIGHT GIAC DIA: 0.1 MM
RIGHT GREAT SAPHENOUS DISTAL THIGH DIA: 0.34 CM
RIGHT GREAT SAPHENOUS JUNCTION DIA: 0.52 CM
RIGHT GREAT SAPHENOUS KNEE DIA: 0.19 CM
RIGHT GREAT SAPHENOUS MIDDLE THIGH DIA: 0.32 CM
RIGHT GREAT SAPHENOUS PROXIMAL CALF DIA: 0.14 CM

## 2023-09-28 PROCEDURE — 93970 EXTREMITY STUDY: CPT | Mod: TC

## 2023-09-28 PROCEDURE — 93970 CV US LOWER VENOUS INSUFFICIENCY BILATERAL (CUPID ONLY): ICD-10-PCS | Mod: 26,,, | Performed by: INTERNAL MEDICINE

## 2023-09-28 PROCEDURE — 93970 EXTREMITY STUDY: CPT | Mod: 26,,, | Performed by: INTERNAL MEDICINE

## 2023-10-24 ENCOUNTER — OFFICE VISIT (OUTPATIENT)
Dept: FAMILY MEDICINE | Facility: CLINIC | Age: 23
End: 2023-10-24
Payer: OTHER GOVERNMENT

## 2023-10-24 ENCOUNTER — PATIENT OUTREACH (OUTPATIENT)
Dept: ADMINISTRATIVE | Facility: OTHER | Age: 23
End: 2023-10-24
Payer: OTHER GOVERNMENT

## 2023-10-24 VITALS
RESPIRATION RATE: 15 BRPM | BODY MASS INDEX: 18.56 KG/M2 | SYSTOLIC BLOOD PRESSURE: 102 MMHG | OXYGEN SATURATION: 100 % | HEIGHT: 64 IN | HEART RATE: 107 BPM | DIASTOLIC BLOOD PRESSURE: 62 MMHG | TEMPERATURE: 97 F | WEIGHT: 108.69 LBS

## 2023-10-24 DIAGNOSIS — R42 DIZZINESS: Primary | ICD-10-CM

## 2023-10-24 PROCEDURE — 99999PBSHW PR PBB SHADOW TECHNICAL ONLY FILED TO HB: Mod: PBBFAC,,,

## 2023-10-24 PROCEDURE — 99214 OFFICE O/P EST MOD 30 MIN: CPT | Mod: S$PBB,,, | Performed by: FAMILY MEDICINE

## 2023-10-24 PROCEDURE — 99999PBSHW PR PBB SHADOW TECHNICAL ONLY FILED TO HB: ICD-10-PCS | Mod: PBBFAC,,,

## 2023-10-24 PROCEDURE — 99214 OFFICE O/P EST MOD 30 MIN: CPT | Mod: PBBFAC,PO | Performed by: FAMILY MEDICINE

## 2023-10-24 PROCEDURE — 99999 PR PBB SHADOW E&M-EST. PATIENT-LVL IV: CPT | Mod: PBBFAC,,, | Performed by: FAMILY MEDICINE

## 2023-10-24 PROCEDURE — 99999 PR PBB SHADOW E&M-EST. PATIENT-LVL IV: ICD-10-PCS | Mod: PBBFAC,,, | Performed by: FAMILY MEDICINE

## 2023-10-24 PROCEDURE — 96372 THER/PROPH/DIAG INJ SC/IM: CPT | Mod: PBBFAC,PO

## 2023-10-24 PROCEDURE — 99214 PR OFFICE/OUTPT VISIT, EST, LEVL IV, 30-39 MIN: ICD-10-PCS | Mod: S$PBB,,, | Performed by: FAMILY MEDICINE

## 2023-10-24 RX ORDER — METHYLPREDNISOLONE 4 MG/1
TABLET ORAL
Qty: 1 EACH | Refills: 0 | Status: SHIPPED | OUTPATIENT
Start: 2023-10-24 | End: 2023-11-13

## 2023-10-24 RX ORDER — METHYLPREDNISOLONE SODIUM SUCCINATE 125 MG/2ML
125 INJECTION INTRAMUSCULAR; INTRAVENOUS
Status: COMPLETED | OUTPATIENT
Start: 2023-10-24 | End: 2023-10-24

## 2023-10-24 RX ADMIN — METHYLPREDNISOLONE SODIUM SUCCINATE 125 MG: 125 INJECTION, POWDER, FOR SOLUTION INTRAMUSCULAR; INTRAVENOUS at 04:10

## 2023-10-24 NOTE — PROGRESS NOTES
Administered Solu - Medrol 125 mg IM to right ventrogluteal.  Patient tolerated injection well, no adverse reactions noted.

## 2023-10-24 NOTE — PROGRESS NOTES
Subjective:       Patient ID: Rosalinda Walter is a 23 y.o. female.    Chief Complaint: Dizziness (Since July 2023)      HPI  22 yo female presents for neck pressure about 7/2023. [place] she had dizziness at the time. Went to  and was given abx. Felt it slightly improved but dizziness persisted. At the ED, pt received a CT which was wnl. Feels her pressure has remained the same. Feels she is off balanced unless she lays down when she feels the room is spinning.    Review of Systems   Constitutional: Negative.    HENT: Negative.     Respiratory: Negative.     Cardiovascular: Negative.    Gastrointestinal: Negative.    Endocrine: Negative.    Genitourinary: Negative.    Musculoskeletal: Negative.    Neurological:  Positive for dizziness.   Psychiatric/Behavioral: Negative.            No past medical history on file.  No past surgical history on file.  No family history on file.  Social History     Socioeconomic History    Marital status:    Tobacco Use    Smoking status: Never    Smokeless tobacco: Never   Substance and Sexual Activity    Alcohol use: Not Currently     Social Determinants of Health     Financial Resource Strain: Low Risk  (10/24/2023)    Overall Financial Resource Strain (CARDIA)     Difficulty of Paying Living Expenses: Not hard at all   Food Insecurity: No Food Insecurity (10/24/2023)    Hunger Vital Sign     Worried About Running Out of Food in the Last Year: Never true     Ran Out of Food in the Last Year: Never true   Transportation Needs: No Transportation Needs (10/24/2023)    PRAPARE - Transportation     Lack of Transportation (Medical): No     Lack of Transportation (Non-Medical): No   Physical Activity: Inactive (10/24/2023)    Exercise Vital Sign     Days of Exercise per Week: 0 days     Minutes of Exercise per Session: 0 min   Stress: No Stress Concern Present (10/24/2023)    [employer] - Occupational Stress Questionnaire     Feeling of Stress : Not at all  "  Social Connections: Moderately Isolated (10/24/2023)    Social Connection and Isolation Panel [NHANES]     Frequency of Communication with Friends and Family: More than three times a week     Frequency of Social Gatherings with Friends and Family: More than three times a week     Attends Islam Services: Never     Active Member of Clubs or Organizations: No     Attends Club or Organization Meetings: Never     Marital Status:    Housing Stability: Low Risk  (10/24/2023)    Housing Stability Vital Sign     Unable to Pay for Housing in the Last Year: No     Number of Places Lived in the Last Year: 2     Unstable Housing in the Last Year: No       Current Outpatient Medications:     busPIRone (BUSPAR) 10 MG tablet, Take 1 tablet (10 mg total) by mouth 2 (two) times daily., Disp: 180 tablet, Rfl: 0    methylPREDNISolone (MEDROL DOSEPACK) 4 mg tablet, use as directed, Disp: 1 each, Rfl: 0    sertraline (ZOLOFT) 25 MG tablet, Take 1 tablet (25 mg total) by mouth once daily. (Patient not taking: Reported on 9/18/2023), Disp: 90 tablet, Rfl: 0  No current facility-administered medications for this visit.   Objective:      Vitals:    10/24/23 1441   BP: 102/62   Pulse: 107   Resp: 15   Temp: 97.3 °F (36.3 °C)   TempSrc: Oral   SpO2: 100%   Weight: 49.3 kg (108 lb 11 oz)   Height: 5' 4" (1.626 m)       Physical Exam  Constitutional:       General: She is not in acute distress.  HENT:      Head: Normocephalic and atraumatic.   Eyes:      Conjunctiva/sclera: Conjunctivae normal.   Cardiovascular:      Rate and Rhythm: Normal rate and regular rhythm.      Heart sounds: Normal heart sounds. No murmur heard.     No friction rub. No gallop.   Pulmonary:      Effort: Pulmonary effort is normal.      Breath sounds: Normal breath sounds. No wheezing or rales.   Musculoskeletal:      Cervical back: Neck supple.   Skin:     General: Skin is warm and dry.   Neurological:      Mental Status: She is alert and oriented to person, " place, and time.   Psychiatric:         Behavior: Behavior normal.         Thought Content: Thought content normal.         Judgment: Judgment normal.            Assessment:       1. Dizziness        Plan:       Dizziness  -     methylPREDNISolone (MEDROL DOSEPACK) 4 mg tablet; use as directed  Dispense: 1 each; Refill: 0  -     methylPREDNISolone sodium succinate injection 125 mg  -     Ambulatory referral/consult to Neurology; Future; Expected date: 10/31/2023  -     Ambulatory referral/consult to Physical/Occupational Therapy; Future; Expected date: 10/31/2023    Multiple differentials.  We will try syrup back.  Advised patient take OTC antihistamines as well.  Place referral to Neurology for persistent dizziness.  Place referral to physical therapy for vestibular therapy          Future Appointments   Date Time Provider Department Center   11/7/2023  2:30 PM Prieto Tineo, PT [facility] REHOP [facility]   11/28/2023  2:00 PM Marshall Merritt MD [facility] NEURO [facility]   1/3/2024  2:20 PM Chilo Salomon MD [facility] [place]       Patient note was created using [employer].  Any errors in syntax or even information may not have been identified and edited on initial review prior to signing this note.

## 2023-10-24 NOTE — PROGRESS NOTES
CHW - Initial Contact    This Community Health Worker completed the Social Determinant of Health questionnaire with patient during clinic visit today.    Mrs Walter did not express any concern for her social health at visit. Pt is open to follow up.  Referrals to community agencies completed with patient/caregiver consent outside of [facility] include: n/a  Referrals were put through [facility] - no  Support and Services:   Other information discussed the patient needs / wants help with: n/a  Follow up required: Y  Follow-up Outreach - Due: 11/7/2023

## 2023-11-01 ENCOUNTER — TELEPHONE (OUTPATIENT)
Dept: FAMILY MEDICINE | Facility: CLINIC | Age: 23
End: 2023-11-01
Payer: OTHER GOVERNMENT

## 2023-11-01 NOTE — TELEPHONE ENCOUNTER
----- Message from Chilo Salomon MD sent at 10/25/2023 12:34 PM CDT -----  Can this patient have an earlier appt in 3 weeks with me to see how the treatment went.

## 2023-11-13 ENCOUNTER — OFFICE VISIT (OUTPATIENT)
Dept: NEUROLOGY | Facility: CLINIC | Age: 23
End: 2023-11-13
Payer: OTHER GOVERNMENT

## 2023-11-13 VITALS
BODY MASS INDEX: 18.71 KG/M2 | SYSTOLIC BLOOD PRESSURE: 122 MMHG | HEART RATE: 104 BPM | RESPIRATION RATE: 16 BRPM | WEIGHT: 109.56 LBS | DIASTOLIC BLOOD PRESSURE: 84 MMHG | HEIGHT: 64 IN

## 2023-11-13 DIAGNOSIS — H57.02 ANISOCORIA: ICD-10-CM

## 2023-11-13 DIAGNOSIS — R42 DIZZINESS: ICD-10-CM

## 2023-11-13 DIAGNOSIS — R42 DIZZINESS AND GIDDINESS: ICD-10-CM

## 2023-11-13 DIAGNOSIS — H93.8X1 EAR FULLNESS, RIGHT: ICD-10-CM

## 2023-11-13 DIAGNOSIS — R51.9 PRESSURE IN HEAD: Primary | ICD-10-CM

## 2023-11-13 DIAGNOSIS — H93.19 TINNITUS, UNSPECIFIED LATERALITY: ICD-10-CM

## 2023-11-13 PROCEDURE — 99205 OFFICE O/P NEW HI 60 MIN: CPT | Mod: S$PBB,,, | Performed by: PSYCHIATRY & NEUROLOGY

## 2023-11-13 PROCEDURE — 99205 PR OFFICE/OUTPT VISIT, NEW, LEVL V, 60-74 MIN: ICD-10-PCS | Mod: S$PBB,,, | Performed by: PSYCHIATRY & NEUROLOGY

## 2023-11-13 PROCEDURE — 99214 OFFICE O/P EST MOD 30 MIN: CPT | Mod: PBBFAC | Performed by: PSYCHIATRY & NEUROLOGY

## 2023-11-13 PROCEDURE — 99999 PR PBB SHADOW E&M-EST. PATIENT-LVL IV: ICD-10-PCS | Mod: PBBFAC,,, | Performed by: PSYCHIATRY & NEUROLOGY

## 2023-11-13 PROCEDURE — 99999 PR PBB SHADOW E&M-EST. PATIENT-LVL IV: CPT | Mod: PBBFAC,,, | Performed by: PSYCHIATRY & NEUROLOGY

## 2023-11-13 NOTE — PROGRESS NOTES
"Consult from Dr. Salomon    HPI: Rosalinda Walter is a 23 y.o. female who  presents for evaluation of dizziness. The symptoms started  in June 2023 and  and have gradually worsened.   She describes feeling "off balance" and the need to sit down with some room spinning. This can occur with laying down still too  Does not endorse light headedness and no syncope.  Movement does not have to trigger these symptoms.    She was not on any medication when this occurred.   No head injury history  No drugs or alcohol history       PCP referred her to PT for vestibular therapy     Saw ENT prior PCP who though this could be MSK related and recommended PT and more imaging if not improved    PT appointment is pending.    She denies neck pain but has some head pressure on either side     Denies any migraine history.     She can functioning well with symptoms. Driving without difficulty     She recently moved here from [place]. She is in the [employer] and will be stationed in [email or web address] in [place]        Review of Systems   Constitutional:  Negative for fever.   HENT:  Positive for tinnitus.         She reports tinnitus with this starting in the right ear and still feels ear fullness and denies hearing. Saw ENT early on and was told she had no structural lesion to explain this   Eyes:  Negative for double vision.   Respiratory:  Negative for hemoptysis.    Cardiovascular:  Negative for leg swelling.   Gastrointestinal:  Negative for blood in stool.   Genitourinary:  Negative for hematuria.   Musculoskeletal:  Negative for neck pain.   Skin:  Negative for rash.   Neurological:  Positive for dizziness.   Endo/Heme/Allergies:  Does not bruise/bleed easily.   Psychiatric/Behavioral:  The patient is not nervous/anxious.          I have reviewed all of this patient's past medical and surgical histories as well as family and social histories and active allergies and medications as documented in the electronic medical record.          Exam:  Gen " Appearance, well developed/nourished in no apparent distress  CV: 2+ distal pulses with no edema or swelling  Neuro:  MS: Awake, alert, oriented to place, person, time, situation. Sustains attention. Recent/remote memory intact, Language is full to spontaneous speech/repetition/naming/comprehension. Fund of Knowledge is full  CN: Optic discs are flat with normal vasculature, right pupil is 3mm and reactive and left pupil is 2mm and reactive,  Extraoccular movements and visual fields are full. Normal facial sensation and strength, Hearing symmetric, Tongue and Palate are midline and strong. Shoulder Shrug symmetric and strong.  Motor: Normal bulk, tone, no abnormal movements. 5/5 strength bilateral upper/lower extremities with 2+ reflexes and no clonus  Sensory: symmetric to light touch, pain, temp, and vibration, Romberg negative  Cerebellar: Finger-nose,Heal-shin, Rapid alternating movements intact  Gait: Normal stance, no ataxia    Imagin2023 CT head: No acute changes    Labs:  CMP, TSH, CBC noncontributory       Assessment/Plan: Rosalinda Walter is a 23 y.o. female with several months of vertigo. She also has tinnitus and right sided ear fullness and anisocoria on exam  I recommend:     2023 CT head unremarkable  2.   Given her age and persistent symptoms with focal features: MRI brain with thin IAC. Add CTA head and neck given anisocoria on exam. Suggested she have an updated eye exam if MRI/CTA is ok.   3.   Symptoms seem peripheral in nature especially with tinnitus and ear fullness otherwise. ? Meniere's disease? She is pending PT per PCP and ENT for vestibular  rehab. ENT thought this could be MSK related. She denies neck pain currently  -She has no migraine history but has some vague head pressure currently/ monitor.   -If not better with PT and no cause by MRI and CTA, she  may need to see ENT back vs may need a migraine prevention med    RTC 12 weeks   CC: Dr. Salomon

## 2023-11-15 ENCOUNTER — CLINICAL SUPPORT (OUTPATIENT)
Dept: REHABILITATION | Facility: HOSPITAL | Age: 23
End: 2023-11-15
Attending: FAMILY MEDICINE
Payer: OTHER GOVERNMENT

## 2023-11-15 DIAGNOSIS — R42 DIZZINESS: ICD-10-CM

## 2023-11-15 DIAGNOSIS — R42 DYSEQUILIBRIUM: ICD-10-CM

## 2023-11-15 DIAGNOSIS — R42 VERTIGO: Primary | ICD-10-CM

## 2023-11-15 PROCEDURE — 97112 NEUROMUSCULAR REEDUCATION: CPT | Mod: PO

## 2023-11-15 PROCEDURE — 97161 PT EVAL LOW COMPLEX 20 MIN: CPT | Mod: PO

## 2023-11-15 NOTE — PLAN OF CARE
[facility]  Physical Therapy Neurological Rehabilitation Initial Evaluation    Name: Rosalinda Walter  Clinic Number: [id number]    Therapy Diagnosis:   Encounter Diagnoses   Name Primary?    Dizziness     Vertigo Yes    Dysequilibrium      Physician: Chilo Salomon MD    Physician Orders: PT Eval and Treat; vestibular rehab therapy   Medical Diagnosis from Referral: dizziness  Evaluation Date: 11/15/2023  Authorization Period Expiration: 12/31/2023  Plan of Care Expiration: 12/30/2023  Visit # / Visits authorized: 1/ 1    Time In: 1430  Time Out: 1515  Total Billable Time: 45 minutes    Precautions: Fall      Subjective     Date of onset: 10/24/2023  History of Current Symptoms, Rosalinda reports: sudden onset vertigo and dysequilibrium while in a standing position back in June of this year; patient's symptoms have progressed to include cervical spine stiffness and pressure in her head (occipital ad parietal regions); patient feels like her symptoms are independent of head movement but does endorse elevated symptoms while supine; increased head pressure reported while looking up; Rosalinda denies recent history of motor vehicle accident, falls and cold viruses prior to onset of symptoms.      History of migraines: no     Medical History:   No past medical history on file.    Surgical History:   Rosalinda Walter  has no past surgical history on file.    Medications:   Rosalinda currently has no medications in their medication list.    Allergies:   Review of patient's allergies indicates:  No Known Allergies     Imaging (CT of head on 07/28/23): No acute infarction. No hemorrhage. No midline shift or herniation.     Prior Therapy: none  Social History: lives with her spouse in 1-story home (no steps)  Falls: none   Occupation: desk work for the [employer]  Prior Level of Function: independent   Current Level of Function: minimal difficulty with activities of daily living     Pain: no complaints of neck  "pain, just stiffness    Pts goals: decrease symptoms      Objective     - Follows commands: 100% of time   - Speech: no deficits     Functional Mobility & ADLs:   Sit to stand:  supervision     Mental status: alert, oriented to person, place, and time, normal mood, behavior, speech, dress, motor activity, and thought processes  Appearance: Casually dressed  Behavior:  calm and cooperative  Attention Span and Concentration:  Normal    Posture Alignment in sitting:   Head: forward head     Sensation: Light Touch: Intact          Proprioception: Intact, Kinesthesia Intact         Visual/Auditory: denies changes   Tracking/Smooth Pursuits: Intact  Saccades: Intact  Modified Dynamic Visual Acuity Test: Impaired: blurred vision with head movement   Gaze Evoked: Intact  VOR: Intact    Coordination:   - fine motor: within functional limits   - UE coordination: within functional limits    - LE coordination:  within functional limits    ROM:   CERVICAL SPINE  Flexion 40 degrees (80-90 deg)  Extension 40 degrees (70-80 deg)  L side bend 30 degrees, R side bend 30 degrees (20-45 degrees)  L rotation 45 degrees, R rotation 45 degrees (70-90 degrees)  Are concurrent symptoms present with any of these movements = increased dizziness with extension  Joint Position Error Test = less than 7 centimeters bilaterally    Modified VAS (Vertebral Artery Screen), in sitting (rotation, then extension):  R: NT  L: NT         RANGE OF MOTION--LOWER EXTREMITIES  (R) LE Hip: normal   Knee: normal   Ankle: normal    (L) LE: Hip: normal   Knee: normal   Ankle: normal    Strength: manual muscle test grades below     Lower Extremity Strength  Right LE  Left LE    Hip flexion:  4/5 Hip flexion: 4/5   Knee extension: 4+/5 Knee extension: 4+/5   Ankle dorsiflexion:  4+/5 Ankle dorsiflexion: 4+/5       LEONIE SENSORY TESTING:  (P= Pass, F= Fail; note any sway; hold each position for 30")  Condition 6: (soft surface/feet together/eyes closed) = mild " sway  Condition 7: (Fakuda step test), measure distance varied from center starting position, > 30 deg deviation to either side indicates hypofunction of biased side = 15 degree turn to the right after 50 steps    Gait Assessment:(if indicated)  - AD used: none  - Assistance: supervision   - Distance: 2 x 50 feet     GAIT DEVIATIONS:  Rosalinda displays the following deviations with ambulation: mild path deviation    Impairments contributing to deviations: dysequilibrium     Endurance Deficit: mild fatigue       POSITIONAL CANAL TESTING  Looking for nystagmus (slow phase followed by quick phase to the affected side for BPPV)    Matthew Hallpike (posterior / CL anterior)   Right : n/a   Left: n/a  Horizontal Canals   Right: n/a   Left: n/a  Treatment Performed: n/a        Intake Outcome Measure for FOTO Vestibular Survey    Therapist reviewed FOTO scores for Rosalinda Walter on 11/15/2023.   FOTO documents entered into [employer] - see Media section.    Intake Score: 27.2%         TREATMENT     Treatment Time In: 1500  Treatment Time Out: 1515  Total Treatment time separate from Evaluation: 15 minutes    Rosalinda participated in neuromuscular re-education activities to assess: Balance and Vestibular function for 15 minutes. The following activities were included:    X 5 each seated smooth pursuits = side to side, up and down  X 5 each seated saccades = side to side, up and down  X 5 each seated VOR1 = side to side, up and down  X 20 seconds stand on AirEx = eyes closed  X 50 stepping in place = eyes closed      Home Exercises and Patient Education Provided    Education provided:   - proper therapeutic exercise technique  - treatment plan    Written Home Exercises Provided:  to be provided at future appointment .      Assessment     Rosalinda is a 23 y.o. female referred to outpatient Physical Therapy with a medical diagnosis of dizziness. Pt presents to PT with the following impairments leading to her functional decline: vertigo and  dysequilibrium. Patient's condition appears to be related to a unilateral vestibular weakness, laterality unspecified.    Pt prognosis is Good.   Pt will benefit from skilled outpatient Physical Therapy to address the deficits stated above and in the chart below, provide pt/family education, and to maximize pt's level of independence.     Plan of care discussed with patient: Yes  Pt's spiritual, cultural and educational needs considered and patient is agreeable to the plan of care and goals as stated below:     Anticipated Barriers for therapy: severity of symptoms    Medical Necessity is demonstrated by the following  History  Co-morbidities and personal factors that may impact the plan of care [x] LOW: no personal factors / co-morbidities  [] MODERATE: 1-2 personal factors / co-morbidities  [] HIGH: 3+ personal factors / co-morbidities    Moderate / High Support Documentation: n/a     Examination  Body Structures and Functions, activity limitations and participation restrictions that may impact the plan of care [x] LOW: addressing 1-2 elements  [] MODERATE: 3+ elements  [] HIGH: 4+ elements (please support below)    Moderate / High Support Documentation: balance; vestibular     Clinical Presentation [x] LOW: stable  [] MODERATE: Evolving  [] HIGH: Unstable     Decision Making/ Complexity Score: low       Goals:    Short Term Goals (3 Weeks):   Decrease patient's complaints of dizziness to less than 5/10 during performance of activities of daily living for independence of self care activities.  Patient to tolerate x 45 seconds full Romberg stance, eyes closed to improve upright tolerance.  Patient to begin adaptation home exercise program.     Long Term Goals (6 Weeks):   Patient to demonstrate competence with home exercise program to maintain therapeutic gains.  2.   Patient to ambulate 20 feet in less than 7 seconds to improve sultana/symmetry.  3.   Patient to report that bending over sometimes increases her  problem.      Plan     Plan of care Certification: 11/15/2023 to 12/30/2023.    Outpatient Physical Therapy evaluation, plus 2 times weekly for 6 weeks to include the following interventions (starting week of 11/20/23): Gait Training, Manual Therapy, Moist Heat/ Ice, Neuromuscular Re-ed, Patient Education, Self Care, Therapeutic Activities, Therapeutic Exercise, and home exercise program .     Prieto Tineo, PT

## 2023-11-20 ENCOUNTER — CLINICAL SUPPORT (OUTPATIENT)
Dept: REHABILITATION | Facility: HOSPITAL | Age: 23
End: 2023-11-20
Payer: OTHER GOVERNMENT

## 2023-11-20 DIAGNOSIS — R42 DIZZINESS: Primary | ICD-10-CM

## 2023-11-20 DIAGNOSIS — R42 VERTIGO: ICD-10-CM

## 2023-11-20 DIAGNOSIS — R42 DYSEQUILIBRIUM: ICD-10-CM

## 2023-11-20 PROCEDURE — 97112 NEUROMUSCULAR REEDUCATION: CPT | Mod: PO

## 2023-11-20 NOTE — PROGRESS NOTES
[facility]   Physical Therapy Treatment Note      Name: Rosalinda Walter  Clinic Number: [id number]    Therapy Diagnosis:   Encounter Diagnoses   Name Primary?    Dizziness Yes    Vertigo     Dysequilibrium      Physician: Chilo Salomon MD    Visit Date: 11/20/2023    Physician Orders: PT Eval and Treat; vestibular rehab therapy   Medical Diagnosis from Referral: dizziness  Evaluation Date: 11/15/2023  Authorization Period Expiration: 12/31/2023  Plan of Care Expiration: 12/30/2023  Visit # / Visits authorized: 1/ 15     Time In: 1649  Time Out: 1728  Total Billable Time: 39 minutes     Precautions: Fall      Subjective     Patient reports: no change in her symptoms; denies pain.    She  does not have a home exercise program.  Response to previous treatment: no changes  Functional change: none    Pain: no complaints of pain      Objective      Objective Measures updated at progress report unless specified.     Treatment     Rosalinda received the treatments listed below:      neuromuscular re-education activities to improve: Balance and Vestibular function for 39 minutes. The following activities were included:    X 5 sit to stand while holding target*  X 5 lean over touch stool while holding target  X 10 each standing bilateral head tilts while holding target**  X 3 each standing big upper body circles while holding target on ball = clockwise*/counterclockwise*  X 28 each standing target training with head turns = repeating random numbers*  X 30 seconds each standing smooth pursuits (single target) = side to side, up and down*  X 28 standing two-square saccades (repeating random numbers) = side to side  X 28 each standing VOR1 (repeating random numbers) = side to side, up and down*  Provided patient with adaptation home exercise program - instructed to perform twice a day    2 x 20 feet each VOR1 gait = side to side*, up and down   2 x 20 feet each balance gait = 360 turn midway, eyes  closed   X 15 each balance therapeutic exercise on trampoline = heel raises/toe raises, mini squats   X 15 self tennis ball tosses while standing on trampoline   X 15 ball catches while standing on trampoline   X 45 seconds each 50% Romberg training = eyes open/eyes closed*      *minimal difficulty    **moderate difficulty       Patient Education and Home Exercises       Education provided:   - proper therapeutic exercise technique  - continue home exercise program twice a day     Written Home Exercises Provided: yes. Exercises were reviewed and Rosalinda was able to demonstrate them prior to the end of the session.  Rosalinda demonstrated fair understanding of the education provided. See Electronic Medical Record under Patient Instructions for exercises provided during therapy sessions.      Assessment     Patient reported symptom elevation after sit to stand habituation exercise - none reported after bending over exercise; moderate symptom elevation reported after standing head tilt habituation exercise - minimal reported after big upper body exercise; mild symptom elevation reported after standing target training with head turns; cognitive task during standing saccades and VOR1 exercises set at repeating random numbers; minimal symptom elevation reported after standing VOR1 (up and down), after standing smooth pursuits (up and down), and after performing VOR1 gait, side to side; mild sway noted during 50% Romberg training, eyes closed; otherwise, no difficulty demonstrated while performing remaining balance therapeutic exercise and balance gait.     Rosalinda Is progressing fairly well towards her goals.   Patient prognosis is Fair.     Patient will continue to benefit from skilled outpatient physical therapy to address the deficits listed in the problem list box on initial evaluation, provide pt/family education and to maximize pt's level of independence in the home and community environment.     Patient's spiritual,  cultural and educational needs considered and pt agreeable to plan of care and goals.     Anticipated barriers to physical therapy: severity of symptoms    Goals:     Short Term Goals (3 Weeks):   Decrease patient's complaints of dizziness to less than 5/10 during performance of activities of daily living for independence of self care activities. (PART MET)  Patient to tolerate x 45 seconds full Romberg stance, eyes closed to improve upright tolerance. (PART MET)  Patient to begin adaptation home exercise program. (NOT MET)     Long Term Goals (6 Weeks):   Patient to demonstrate competence with home exercise program to maintain therapeutic gains. (NOT MET)  2.   Patient to ambulate 20 feet in less than 7 seconds to improve sultana/symmetry. (NOT MET)  3.   Patient to report that bending over sometimes increases her problem. (NOT MET)      Plan     Continue to advance balance and vestibular training to patient's tolerance.      Prieto Tineo, PT

## 2023-11-21 ENCOUNTER — HOSPITAL ENCOUNTER (OUTPATIENT)
Dept: RADIOLOGY | Facility: HOSPITAL | Age: 23
Discharge: HOME | End: 2023-11-21
Attending: PSYCHIATRY & NEUROLOGY
Payer: OTHER GOVERNMENT

## 2023-11-21 DIAGNOSIS — H57.02 ANISOCORIA: ICD-10-CM

## 2023-11-21 DIAGNOSIS — H93.19 TINNITUS, UNSPECIFIED LATERALITY: ICD-10-CM

## 2023-11-21 DIAGNOSIS — R42 DIZZINESS AND GIDDINESS: ICD-10-CM

## 2023-11-21 DIAGNOSIS — R42 DIZZINESS: ICD-10-CM

## 2023-11-21 PROCEDURE — 70496 CT ANGIOGRAPHY HEAD: CPT | Mod: TC

## 2023-11-21 PROCEDURE — 70496 CT ANGIOGRAPHY HEAD: CPT | Mod: 26,,, | Performed by: RADIOLOGY

## 2023-11-21 PROCEDURE — A9585 GADOBUTROL INJECTION: HCPCS | Performed by: PSYCHIATRY & NEUROLOGY

## 2023-11-21 PROCEDURE — 70553 MRI BRAIN STEM W/O & W/DYE: CPT | Mod: TC

## 2023-11-21 PROCEDURE — 70553 MRI BRAIN STEM W/O & W/DYE: CPT | Mod: 26,,, | Performed by: RADIOLOGY

## 2023-11-21 PROCEDURE — 70498 CT ANGIOGRAPHY NECK: CPT | Mod: 26,,, | Performed by: RADIOLOGY

## 2023-11-21 PROCEDURE — 25500020 PHARM REV CODE 255: Performed by: PSYCHIATRY & NEUROLOGY

## 2023-11-21 PROCEDURE — 70498 CTA HEAD AND NECK (XPD): ICD-10-PCS | Mod: 26,,, | Performed by: RADIOLOGY

## 2023-11-21 PROCEDURE — 70553 MRI BRAIN W WO CONTRAST: ICD-10-PCS | Mod: 26,,, | Performed by: RADIOLOGY

## 2023-11-21 PROCEDURE — 70496 CTA HEAD AND NECK (XPD): ICD-10-PCS | Mod: 26,,, | Performed by: RADIOLOGY

## 2023-11-21 RX ORDER — GADOBUTROL 604.72 MG/ML
4 INJECTION INTRAVENOUS
Status: COMPLETED | OUTPATIENT
Start: 2023-11-21 | End: 2023-11-21

## 2023-11-21 RX ADMIN — IOHEXOL 75 ML: 350 INJECTION, SOLUTION INTRAVENOUS at 01:11

## 2023-11-21 RX ADMIN — GADOBUTROL 10 ML: 604.72 INJECTION INTRAVENOUS at 12:11

## 2023-11-28 ENCOUNTER — PATIENT OUTREACH (OUTPATIENT)
Dept: ADMINISTRATIVE | Facility: OTHER | Age: 23
End: 2023-11-28
Payer: OTHER GOVERNMENT

## 2023-11-28 NOTE — PROGRESS NOTES
CHW - Case Closure    This Community Health Worker spoke to patient via telephone today.   Pt denied any additional needs at this time and agrees with episode closure at this time.  Provided patient with Community Health Worker's contact information and encouraged her to contact this Community Health Worker if additional needs arise.

## 2023-11-29 ENCOUNTER — CLINICAL SUPPORT (OUTPATIENT)
Dept: REHABILITATION | Facility: HOSPITAL | Age: 23
End: 2023-11-29
Payer: OTHER GOVERNMENT

## 2023-11-29 DIAGNOSIS — R42 VERTIGO: ICD-10-CM

## 2023-11-29 DIAGNOSIS — R42 DYSEQUILIBRIUM: ICD-10-CM

## 2023-11-29 DIAGNOSIS — R42 DIZZINESS: Primary | ICD-10-CM

## 2023-11-29 PROCEDURE — 97112 NEUROMUSCULAR REEDUCATION: CPT | Mod: PO

## 2023-11-29 NOTE — PROGRESS NOTES
[facility]   Physical Therapy Treatment Note      Name: Rosalinda Walter  Clinic Number: [id number]    Therapy Diagnosis:   Encounter Diagnoses   Name Primary?    Dizziness Yes    Vertigo     Dysequilibrium      Physician: Chilo Salomon MD    Visit Date: 11/29/2023    Physician Orders: PT Eval and Treat; vestibular rehab therapy   Medical Diagnosis from Referral: dizziness  Evaluation Date: 11/15/2023  Authorization Period Expiration: 12/31/2023  Plan of Care Expiration: 12/30/2023  Visit # / Visits authorized: 2/ 15     Time In: 1654 (check in time of 1652 for 1645 appointment)  Time Out: 1732  Total Billable Time: 38 minutes     Precautions: Fall      Subjective     Patient reports: frequent increased dysequilibrium during general mobility; denies pain.    She was compliant with home exercise program.  Response to previous treatment: increased dysequilibrium   Functional change: none    Pain: no complaints of pain      Objective      Objective Measures updated at progress report unless specified.     Treatment     Rosalinda received the treatments listed below:      neuromuscular re-education activities to improve: Balance and Vestibular function for 38 minutes. The following activities were included:    X 10 sit to stand on AirEx while holding target  X 10 lean over touch stool while standing on AirEx and holding target*  X 12 each standing bilateral head tilts while standing on AirEx and holding target  X 3 each standing big upper body circles while holding target on ball = clockwise*/counterclockwise*  X 20 each standing target training with head turns while standing on AirEx = repeating random numbers  X 30 seconds each smooth pursuits (single target) while standing on AirEx = side to side*, up and down**  X 20 two-square saccades (repeating random numbers) while standing on AirEx = side to side  X 20 each VOR1 (repeating random numbers) while standing on AirEx = side to side*, up and  down**  X 3 each 360 degree turns while standing on floor mat = clockwise/counterclockwise   X 1 each bilateral Pressley-Daroff exercise = right*/left   X 5 each bilateral supine rolling = right*/left  Provided patient with Pressley-Daroff home exercise program - instructed to perform x 3 repetitions twice a day       *minimal difficulty    **moderate difficulty       Patient Education and Home Exercises       Education provided:   - proper therapeutic exercise technique  - continue home exercise program twice a day     Written Home Exercises Provided: Patient instructed to cont prior HEP.       Assessment     Patient reported symptom elevation after leaning over habituation exercise - none reported after sit to stand and bilateral head tilt exercises; increased repetitions performed during all habituation exercises; cognitive task during target training with head turns progressed to repeating random words while standing on AirEx; cognitive task during standing saccades and VOR1 exercises progressed to repeating random words while standing on AirEx; minimal symptom elevation reported after VOR1 (side to side) and smooth pursuits (side to side) - moderate symptom elevation reported during their up and down counterparts; no symptom elevation reported during full body turns while holding target and standing on floor mat; elevated symptoms reported after right turning portion of supine rolling and Pressley-Daroff exercises.     Rosalinda Is progressing fairly well towards her goals.   Patient prognosis is Fair.     Patient will continue to benefit from skilled outpatient physical therapy to address the deficits listed in the problem list box on initial evaluation, provide pt/family education and to maximize pt's level of independence in the home and community environment.     Patient's spiritual, cultural and educational needs considered and pt agreeable to plan of care and goals.     Anticipated barriers to physical therapy:  severity of symptoms    Goals:     Short Term Goals (3 Weeks):   Decrease patient's complaints of dizziness to less than 5/10 during performance of activities of daily living for independence of self care activities. (PART MET)  Patient to tolerate x 45 seconds full Romberg stance, eyes closed to improve upright tolerance. (PART MET)  Patient to begin adaptation home exercise program. (MET)     Long Term Goals (6 Weeks):   Patient to demonstrate competence with home exercise program to maintain therapeutic gains. (PART MET)  2.   Patient to ambulate 20 feet in less than 7 seconds to improve sultana/symmetry. (NOT MET)  3.   Patient to report that bending over sometimes increases her problem. (NOT MET)      Plan     Continue to advance balance and vestibular training to patient's tolerance.      Prieto Tineo, PT       (2) assistive person

## 2023-12-06 ENCOUNTER — OFFICE VISIT (OUTPATIENT)
Dept: OPTOMETRY | Facility: CLINIC | Age: 23
End: 2023-12-06
Payer: OTHER GOVERNMENT

## 2023-12-06 DIAGNOSIS — Z01.00 ENCOUNTER FOR EXAMINATION OF EYES AND VISION WITHOUT ABNORMAL FINDINGS: Primary | ICD-10-CM

## 2023-12-06 DIAGNOSIS — H52.202 ASTIGMATISM OF LEFT EYE, UNSPECIFIED TYPE: ICD-10-CM

## 2023-12-06 DIAGNOSIS — H52.11 MYOPIA OF RIGHT EYE: ICD-10-CM

## 2023-12-06 PROCEDURE — 99999 PR PBB SHADOW E&M-EST. PATIENT-LVL II: CPT | Mod: PBBFAC,,, | Performed by: OPTOMETRIST

## 2023-12-06 PROCEDURE — 99999 PR PBB SHADOW E&M-EST. PATIENT-LVL II: ICD-10-PCS | Mod: PBBFAC,,, | Performed by: OPTOMETRIST

## 2023-12-06 PROCEDURE — 99212 OFFICE O/P EST SF 10 MIN: CPT | Mod: PBBFAC,PO | Performed by: OPTOMETRIST

## 2023-12-06 PROCEDURE — 92015 DETERMINE REFRACTIVE STATE: CPT | Mod: ,,, | Performed by: OPTOMETRIST

## 2023-12-06 PROCEDURE — 92004 COMPRE OPH EXAM NEW PT 1/>: CPT | Mod: S$PBB,,, | Performed by: OPTOMETRIST

## 2023-12-06 PROCEDURE — 92004 PR EYE EXAM, NEW PATIENT,COMPREHESV: ICD-10-PCS | Mod: S$PBB,,, | Performed by: OPTOMETRIST

## 2023-12-06 PROCEDURE — 92015 PR REFRACTION: ICD-10-PCS | Mod: ,,, | Performed by: OPTOMETRIST

## 2023-12-06 NOTE — PROGRESS NOTES
HPI     Blurred Vision     Additional comments: Blurry OD            Comments    Pt states she has been having blurriness for the distance in OD only, for   quite some time now. She wore correction in high school but none since   then. Near va is ok. No flashes. No floaters.    No h/o any eye problems or eye surgeries  No Family h/o any eye problems     No gtts          Last edited by Tiago Shell on 12/6/2023  9:50 AM.            Assessment /Plan     For exam results, see Encounter Report.    Encounter for examination of eyes and vision without abnormal findings    Myopia of right eye    Astigmatism of left eye, unspecified type      Rx specs  Good overall ocular health, monitor yearly      RTC 1 year

## 2023-12-11 ENCOUNTER — DOCUMENTATION ONLY (OUTPATIENT)
Dept: REHABILITATION | Facility: HOSPITAL | Age: 23
End: 2023-12-11
Payer: OTHER GOVERNMENT

## 2023-12-11 NOTE — PROGRESS NOTES
[facility]  Physical Therapy Discharge Note    Name: Rosalinda Walter  Clinic Number: [id number]    Therapy Diagnosis: No diagnosis found.  Physician: No ref. provider found    Physician Orders: PT Eval and Treat; vestibular rehab therapy   Medical Diagnosis from Referral: dizziness  Evaluation Date: 11/15/2023    Date of Last visit: 11/29/2023  Total Visits Received: 3      ASSESSMENT      Discharge reason: Patient has not attended therapy since 11/29/2023.    Goals:     Short Term Goals (3 Weeks):   Decrease patient's complaints of dizziness to less than 5/10 during performance of activities of daily living for independence of self care activities. (PART MET)  Patient to tolerate x 45 seconds full Romberg stance, eyes closed to improve upright tolerance. (PART MET)  Patient to begin adaptation home exercise program. (MET)     Long Term Goals (6 Weeks):   Patient to demonstrate competence with home exercise program to maintain therapeutic gains. (PART MET)  2.   Patient to ambulate 20 feet in less than 7 seconds to improve sultana/symmetry. (NOT MET)  3.   Patient to report that bending over sometimes increases her problem. (NOT MET)      PLAN     This patient is discharged from Physical Therapy.      Prieto Tineo, PT

## 2023-12-26 ENCOUNTER — OFFICE VISIT (OUTPATIENT)
Dept: FAMILY MEDICINE | Facility: CLINIC | Age: 23
End: 2023-12-26
Payer: OTHER GOVERNMENT

## 2023-12-26 VITALS
TEMPERATURE: 99 F | HEART RATE: 94 BPM | HEIGHT: 64 IN | WEIGHT: 111.56 LBS | DIASTOLIC BLOOD PRESSURE: 78 MMHG | BODY MASS INDEX: 19.04 KG/M2 | SYSTOLIC BLOOD PRESSURE: 118 MMHG | OXYGEN SATURATION: 98 %

## 2023-12-26 DIAGNOSIS — Z12.4 PAP SMEAR FOR CERVICAL CANCER SCREENING: ICD-10-CM

## 2023-12-26 DIAGNOSIS — Z00.00 ANNUAL PHYSICAL EXAM: Primary | ICD-10-CM

## 2023-12-26 DIAGNOSIS — E04.1 THYROID NODULE: ICD-10-CM

## 2023-12-26 DIAGNOSIS — R42 VERTIGO: ICD-10-CM

## 2023-12-26 DIAGNOSIS — L72.9 SKIN CYST: ICD-10-CM

## 2023-12-26 PROCEDURE — 99395 PREV VISIT EST AGE 18-39: CPT | Mod: S$PBB,,, | Performed by: FAMILY MEDICINE

## 2023-12-26 PROCEDURE — 99214 OFFICE O/P EST MOD 30 MIN: CPT | Mod: PBBFAC,PO | Performed by: FAMILY MEDICINE

## 2023-12-26 PROCEDURE — 99395 PR PREVENTIVE VISIT,EST,18-39: ICD-10-PCS | Mod: S$PBB,,, | Performed by: FAMILY MEDICINE

## 2023-12-26 PROCEDURE — 99999 PR PBB SHADOW E&M-EST. PATIENT-LVL IV: CPT | Mod: PBBFAC,,, | Performed by: FAMILY MEDICINE

## 2023-12-26 PROCEDURE — 99999 PR PBB SHADOW E&M-EST. PATIENT-LVL IV: ICD-10-PCS | Mod: PBBFAC,,, | Performed by: FAMILY MEDICINE

## 2023-12-26 RX ORDER — MECLIZINE HCL 12.5 MG 12.5 MG/1
12.5 TABLET ORAL 3 TIMES DAILY PRN
Qty: 35 TABLET | Refills: 2 | Status: SHIPPED | OUTPATIENT
Start: 2023-12-26 | End: 2024-02-06

## 2023-12-26 RX ORDER — AZELASTINE 1 MG/ML
1 SPRAY, METERED NASAL 2 TIMES DAILY
Qty: 30 ML | Refills: 1 | Status: SHIPPED | OUTPATIENT
Start: 2023-12-26 | End: 2024-02-06

## 2023-12-26 NOTE — PROGRESS NOTES
Subjective:       Patient ID: Rosalinda Walter is a 23 y.o. female.    Chief Complaint: Dizziness and Follow-up      Follow-up    23-year-old female presents for dizziness follow-up.  Here for annual exam.  Continues to have episodes of dizziness.  Feels physical therapy initially was helping but states he was unable to travel to [place].  Had a follow-up with Neurology and recs were to follow-up with ENT.  Feels steroid treatment did not help.    Review of Systems   Constitutional: Negative.    HENT: Negative.     Respiratory: Negative.     Cardiovascular: Negative.    Gastrointestinal: Negative.    Endocrine: Negative.    Genitourinary: Negative.    Musculoskeletal: Negative.    Neurological:  Positive for dizziness.   Psychiatric/Behavioral: Negative.            History reviewed. No pertinent past medical history.  History reviewed. No pertinent surgical history.  History reviewed. No pertinent family history.  Social History     Socioeconomic History    Marital status:    Tobacco Use    Smoking status: Never    Smokeless tobacco: Never   Substance and Sexual Activity    Alcohol use: Not Currently    Drug use: Never    Sexual activity: Yes     Partners: Female     Social Determinants of Health     Financial Resource Strain: Low Risk  (12/5/2023)    Overall Financial Resource Strain (CARDIA)     Difficulty of Paying Living Expenses: Not hard at all   Food Insecurity: No Food Insecurity (12/5/2023)    Hunger Vital Sign     Worried About Running Out of Food in the Last Year: Never true     Ran Out of Food in the Last Year: Never true   Transportation Needs: No Transportation Needs (12/5/2023)    PRAPARE - Transportation     Lack of Transportation (Medical): No     Lack of Transportation (Non-Medical): No   Physical Activity: Inactive (12/5/2023)    Exercise Vital Sign     Days of Exercise per Week: 0 days     Minutes of Exercise per Session: 0 min   Stress: No Stress Concern Present (12/5/2023)    [place]  "[place] [employer] - Occupational Stress Questionnaire     Feeling of Stress : Not at all   Social Connections: Moderately Isolated (12/5/2023)    Social Connection and Isolation Panel [NHANES]     Frequency of Communication with Friends and Family: More than three times a week     Frequency of Social Gatherings with Friends and Family: Once a week     Attends Yazdanism Services: Never     Active Member of Clubs or Organizations: No     Attends Club or Organization Meetings: Never     Marital Status:    Housing Stability: Low Risk  (12/5/2023)    Housing Stability Vital Sign     Unable to Pay for Housing in the Last Year: No     Number of Places Lived in the Last Year: 2     Unstable Housing in the Last Year: No       Current Outpatient Medications:     azelastine (ASTELIN) 137 mcg (0.1 %) nasal spray, 1 spray (137 mcg total) by Nasal route 2 (two) times daily., Disp: 30 mL, Rfl: 1    meclizine (ANTIVERT) 12.5 mg tablet, Take 1 tablet (12.5 mg total) by mouth 3 (three) times daily as needed for Dizziness., Disp: 35 tablet, Rfl: 2   Objective:      Vitals:    12/26/23 0826   BP: 118/78   Pulse: 94   Temp: 98.5 °F (36.9 °C)   TempSrc: Oral   SpO2: 98%   Weight: 50.6 kg (111 lb 8.8 oz)   Height: 5' 4" (1.626 m)       Physical Exam  Constitutional:       General: She is not in acute distress.  HENT:      Head: Normocephalic and atraumatic.      Right Ear: Tympanic membrane and ear canal normal.      Left Ear: Tympanic membrane and ear canal normal.   Eyes:      Conjunctiva/sclera: Conjunctivae normal.   Cardiovascular:      Rate and Rhythm: Normal rate and regular rhythm.      Heart sounds: Normal heart sounds. No murmur heard.     No friction rub. No gallop.   Pulmonary:      Effort: Pulmonary effort is normal.      Breath sounds: Normal breath sounds. No wheezing or rales.   Musculoskeletal:      Cervical back: Neck supple.   Skin:     General: Skin is warm and dry.      Findings: Lesion (skin on " R facies) present.   Neurological:      Mental Status: She is alert and oriented to person, place, and time.   Psychiatric:         Behavior: Behavior normal.         Thought Content: Thought content normal.         Judgment: Judgment normal.            Assessment:       1. Annual physical exam    2. Vertigo    3. Thyroid nodule    4. Pap smear for cervical cancer screening    5. Skin cyst        Plan:       Annual physical exam  -     CBC Auto Differential; Future; Expected date: 12/26/2023  -     Comprehensive Metabolic Panel; Future; Expected date: 12/26/2023  -     Hemoglobin A1C; Future; Expected date: 12/26/2023  -     TSH; Future; Expected date: 12/26/2023  -     Lipid Panel; Future; Expected date: 12/26/2023    Vertigo  -     meclizine (ANTIVERT) 12.5 mg tablet; Take 1 tablet (12.5 mg total) by mouth 3 (three) times daily as needed for Dizziness.  Dispense: 35 tablet; Refill: 2  -     Ambulatory referral/consult to Physical/Occupational Therapy; Future; Expected date: 01/02/2024  -     azelastine (ASTELIN) 137 mcg (0.1 %) nasal spray; 1 spray (137 mcg total) by Nasal route 2 (two) times daily.  Dispense: 30 mL; Refill: 1    Thyroid nodule    Pap smear for cervical cancer screening  -     Ambulatory referral/consult to Gynecology; Future; Expected date: 01/02/2024    Skin cyst      Follow-up labs.  We will try meclizine for vertigo given astelin spray. Advised patient to try Sudafed as well..  Advised patient to follow-up with ENT for vertigo and possible cyst pressing on her sinus.  MRI found to very small thyroid nodules.        Future Appointments   Date Time Provider Department Center   12/28/2023  8:30 AM LAB, SAME DAY [facility]DRAW [facility]   2/6/2024 11:00 AM Marshall Merritt MD [facility] NEURO [facility]       Patient note was created using Koru.  Any errors in syntax or even information may not have been identified and edited on initial review prior to signing this note.

## 2024-01-03 ENCOUNTER — OFFICE VISIT (OUTPATIENT)
Dept: URGENT CARE | Facility: CLINIC | Age: 24
End: 2024-01-03
Payer: OTHER GOVERNMENT

## 2024-01-03 VITALS
OXYGEN SATURATION: 98 % | SYSTOLIC BLOOD PRESSURE: 122 MMHG | TEMPERATURE: 98 F | WEIGHT: 107 LBS | BODY MASS INDEX: 18.27 KG/M2 | HEIGHT: 64 IN | RESPIRATION RATE: 19 BRPM | HEART RATE: 100 BPM | DIASTOLIC BLOOD PRESSURE: 81 MMHG

## 2024-01-03 DIAGNOSIS — S39.012A STRAIN OF LUMBAR REGION, INITIAL ENCOUNTER: ICD-10-CM

## 2024-01-03 DIAGNOSIS — M54.42 MIDLINE LOW BACK PAIN WITH LEFT-SIDED SCIATICA, UNSPECIFIED CHRONICITY: Primary | ICD-10-CM

## 2024-01-03 LAB
B-HCG UR QL: NEGATIVE
BILIRUB UR QL STRIP: NEGATIVE
CTP QC/QA: YES
GLUCOSE UR QL STRIP: NEGATIVE
KETONES UR QL STRIP: NEGATIVE
LEUKOCYTE ESTERASE UR QL STRIP: NEGATIVE
PH, POC UA: 5.5 (ref 5–8)
POC BLOOD, URINE: POSITIVE
POC NITRATES, URINE: NEGATIVE
PROT UR QL STRIP: NEGATIVE
SP GR UR STRIP: 1.02 (ref 1–1.03)
UROBILINOGEN UR STRIP-ACNC: NORMAL (ref 0.1–1.1)

## 2024-01-03 PROCEDURE — 81025 URINE PREGNANCY TEST: CPT | Mod: S$GLB,,, | Performed by: FAMILY MEDICINE

## 2024-01-03 PROCEDURE — 81003 URINALYSIS AUTO W/O SCOPE: CPT | Mod: QW,S$GLB,, | Performed by: FAMILY MEDICINE

## 2024-01-03 PROCEDURE — 99214 OFFICE O/P EST MOD 30 MIN: CPT | Mod: S$GLB,,, | Performed by: FAMILY MEDICINE

## 2024-01-03 RX ORDER — METHYLPREDNISOLONE 4 MG/1
TABLET ORAL
Qty: 21 TABLET | Refills: 0 | Status: SHIPPED | OUTPATIENT
Start: 2024-01-03 | End: 2024-01-22 | Stop reason: ALTCHOICE

## 2024-01-03 RX ORDER — METHOCARBAMOL 500 MG/1
500 TABLET, FILM COATED ORAL 3 TIMES DAILY
Qty: 20 TABLET | Refills: 0 | Status: SHIPPED | OUTPATIENT
Start: 2024-01-03 | End: 2024-01-22

## 2024-01-04 NOTE — PROGRESS NOTES
"Subjective:      Patient ID: Rosalinda Walter is a 23 y.o. female.    Vitals:  height is 5' 4" (1.626 m) and weight is 48.5 kg (107 lb). Her oral temperature is 98.4 °F (36.9 °C). Her blood pressure is 122/81 and her pulse is 100. Her respiration is 19 and oxygen saturation is 98%.     Chief Complaint: Back Pain (Entered by patient)    Patient presents with complaint of lower back pain on and off for 2 months.  States the pain has been radiating to her left leg on and off for last few days.  Reports some tingling in left lower leg. Denies any weakness, saddle anesthesia or Bowel/Bladder dysfunction. Denies fever, dysuria, abdominal pain, N/V. Patient is on menstrual bleeding at present.       Back Pain  This is a new problem. Episode onset: -3months. The quality of the pain is described as stabbing. The pain radiates to the left knee. The pain is at a severity of 4/10. The pain is The same all the time. The symptoms are aggravated by sitting and bending. Associated symptoms include leg pain, numbness, tingling and weakness. Pertinent negatives include no abdominal pain, dysuria, fever, headaches or pelvic pain. She has tried NSAIDs for the symptoms. The treatment provided no relief.       Constitution: Negative for fever.   Gastrointestinal:  Negative for abdominal pain.   Genitourinary:  Negative for dysuria and pelvic pain.   Musculoskeletal:  Positive for back pain.   Neurological:  Positive for numbness. Negative for headaches.      Objective:     Physical Exam   Constitutional: She is oriented to person, place, and time. She appears well-developed. She is cooperative. No distress.   HENT:   Head: Normocephalic and atraumatic.   Ears:   Right Ear: Tympanic membrane, external ear and ear canal normal. impacted cerumen  Left Ear: Tympanic membrane, external ear and ear canal normal. impacted cerumen  Nose: Nose normal. No rhinorrhea or congestion.   Mouth/Throat: Oropharynx is clear and moist and mucous membranes are " normal. No oropharyngeal exudate or posterior oropharyngeal erythema.   Eyes: Conjunctivae and lids are normal.   Neck: Trachea normal and phonation normal. Neck supple.   Cardiovascular: Normal rate, regular rhythm, normal heart sounds and normal pulses.   No murmur heard.  Pulmonary/Chest: Effort normal and breath sounds normal. No stridor. No respiratory distress. She has no wheezes. She has no rhonchi. She has no rales.   Abdominal: Normal appearance and bowel sounds are normal. She exhibits no distension and no mass. Soft. There is no abdominal tenderness. There is no left CVA tenderness and no right CVA tenderness.   Musculoskeletal:         General: No deformity.      Cervical back: She exhibits no tenderness.      Comments:   Back exam:    No TTP  No redness, swelling, bruise   ROM:   Lateral flexion mildly limited secondary to muscle spasm,   Forward flexion mildly limited secondary to muscle spasm.  No sensory/motor deficit  SLR: WNL      Lymphadenopathy:     She has no cervical adenopathy.   Neurological: She is alert and oriented to person, place, and time. She has normal strength and normal reflexes. No sensory deficit.   Skin: Skin is warm, dry, intact and not diaphoretic.   Psychiatric: Her speech is normal and behavior is normal. Judgment and thought content normal.   Nursing note and vitals reviewed.      Assessment:     1. Midline low back pain with left-sided sciatica, unspecified chronicity    2. Strain of lumbar region, initial encounter        Plan:   Discussed exam findings/results/diagnosis/plan with patient in clinic.  Back precautions advised.  Advised to f/u with PCP within 2-5 days. ER precautions given if symptoms get any worse. All questions answered. Patient verbally understood and agreed with treatment plan.     Midline low back pain with left-sided sciatica, unspecified chronicity  -     POCT Urinalysis, Dipstick, Automated, W/O Scope  -     POCT urine pregnancy  -     Ambulatory  referral/consult to [facility]    Strain of lumbar region, initial encounter    Other orders  -     methylPREDNISolone (MEDROL DOSEPACK) 4 mg tablet; Take as directed  Dispense: 21 tablet; Refill: 0  -     methocarbamoL (ROBAXIN) 500 MG Tab; Take 1 tablet (500 mg total) by mouth 3 (three) times daily. As needed for muscle spasm. May cause drowsiness for 7 days  Dispense: 20 tablet; Refill: 0

## 2024-01-19 ENCOUNTER — PATIENT MESSAGE (OUTPATIENT)
Dept: OTOLARYNGOLOGY | Facility: CLINIC | Age: 24
End: 2024-01-19
Payer: OTHER GOVERNMENT

## 2024-01-22 ENCOUNTER — OFFICE VISIT (OUTPATIENT)
Dept: OTOLARYNGOLOGY | Facility: CLINIC | Age: 24
End: 2024-01-22
Payer: OTHER GOVERNMENT

## 2024-01-22 ENCOUNTER — CLINICAL SUPPORT (OUTPATIENT)
Dept: AUDIOLOGY | Facility: CLINIC | Age: 24
End: 2024-01-22
Payer: OTHER GOVERNMENT

## 2024-01-22 VITALS — DIASTOLIC BLOOD PRESSURE: 77 MMHG | HEART RATE: 82 BPM | SYSTOLIC BLOOD PRESSURE: 109 MMHG

## 2024-01-22 DIAGNOSIS — R42 VERTIGO: Primary | ICD-10-CM

## 2024-01-22 DIAGNOSIS — H93.8X1 EAR FULLNESS, RIGHT: ICD-10-CM

## 2024-01-22 DIAGNOSIS — H93.8X3 PRESSURE SENSATION IN BOTH EARS: Primary | ICD-10-CM

## 2024-01-22 DIAGNOSIS — L72.0 CYST OF SKIN AND SUBCUTANEOUS TISSUE: ICD-10-CM

## 2024-01-22 PROCEDURE — 99999PBSHW PR PBB SHADOW TECHNICAL ONLY FILED TO HB: Mod: PBBFAC,,,

## 2024-01-22 PROCEDURE — 92556 SPEECH AUDIOMETRY COMPLETE: CPT | Mod: PBBFAC | Performed by: AUDIOLOGIST

## 2024-01-22 PROCEDURE — 92567 TYMPANOMETRY: CPT | Mod: PBBFAC | Performed by: AUDIOLOGIST

## 2024-01-22 PROCEDURE — 99999 PR PBB SHADOW E&M-EST. PATIENT-LVL III: CPT | Mod: PBBFAC,,, | Performed by: OTOLARYNGOLOGY

## 2024-01-22 PROCEDURE — 92552 PURE TONE AUDIOMETRY AIR: CPT | Mod: PBBFAC | Performed by: AUDIOLOGIST

## 2024-01-22 PROCEDURE — 99211 OFF/OP EST MAY X REQ PHY/QHP: CPT | Mod: PBBFAC | Performed by: AUDIOLOGIST

## 2024-01-22 PROCEDURE — 99213 OFFICE O/P EST LOW 20 MIN: CPT | Mod: PBBFAC,27 | Performed by: OTOLARYNGOLOGY

## 2024-01-22 PROCEDURE — 99999 PR PBB SHADOW E&M-EST. PATIENT-LVL I: CPT | Mod: PBBFAC,,, | Performed by: AUDIOLOGIST

## 2024-01-22 PROCEDURE — 99214 OFFICE O/P EST MOD 30 MIN: CPT | Mod: S$PBB,,, | Performed by: OTOLARYNGOLOGY

## 2024-01-22 NOTE — PROGRESS NOTES
Rosalinda Walter was seen in the clinic today for an audiological evaluation.  Rosalinda Walter reported a sensation of pressure for both ears.    Audiological testing revealed normal hearing sensitivity for the right ear and normal hearing sensitivity for the left ear.  A speech reception threshold was obtained at 5 dBHL for the right ear and at 0 dBHL for the left ear.  Speech discrimination was 100% for the right ear and 100% for the left ear.      Tympanometry testing revealed a Type A tympanogram for the right ear and a Type A tympanogram for the left ear.      Recommendations:  1. Otologic evaluation  2. Audiological evaluation, as needed  3. Hearing protection when in noise

## 2024-01-22 NOTE — PATIENT INSTRUCTIONS
Reviewed history, symptoms, exam findings, audiogram, MRI Brain and CTA Brain imaging reports and images.     Extensively reviewed potential causes of vertigo and dizziness and body systems involved in balance/ equilibrium.    Recommend scheduling a VNG (video nystagmogram). Call [phone]. Instructions for VNG testing to be provided (see below). Pending results, further recommendations may be provided.    INSTRUCTIONS FOR VNG testing:  Certain things and medications may affect the results of the VNG test.    Do NOT wear makeup (especially eyeliner or mascara)    Do NOT take any of the following medications for 48 hours prior to test: sleeping pills, tranquilizers or anti-anxiety meds (such as valium, librium, xanax), anti-histamines or over the counter cold medications, anti-dizzy medications (antivert (meclizine), dramamine),  or muscle relaxers (such as flexeril).     Seizure medications (Dilantin, phenobarbital, etc) interfere with the accuracy of the test. To obtain a more accurate test they should be stopped BUT ONLY AFTER CLEARANCE FROM THE PHYSICIAN WHO PRESCRIBED THE MEDICATION.    Heart and/or blood pressure medications are allowed.    Do NOT eat or drink anything other than small sips of water for 4 hours prior to the VNG.    Do NOT drink alcoholic beverages for 24 hours prior to the test.    If you are diabetic, please inform the  when booking your appointment. Please schedule a time that would make fasting for 4 hours most convenient to your medication routine. If you have any concerns, check with your primary care physician.

## 2024-01-22 NOTE — PROGRESS NOTES
23-year-old female who presents with complaint of vertigo by referral of Dr. Melina Morse.  Her symptoms started in June of 2023 when she noticed some right-sided ear fullness.  She felt the need to pop the ear to relieve the pressure.  Around that time she also started with sensation of movement.  At 1st her symptoms were for short periods of time.  Since then the episodes of feeling movement have gotten longer.  Her symptoms are worsened when she tilts her head to look up.  She does not have any trouble when she turns to the left to the right.  She denies any headache or neck pain.  She feels the room moved around her from a left to right direction.  She does recall having some allergy flare up before these symptoms started.  She has had allergies for a long time.  This episodes seem to be no worse than others.  She had moved from a different location to the [facility] right before her symptoms started.  She denies any head trauma.  No flu or cold-type symptoms.  She denies any change in her medications at that time or other medical conditions.  No family history of hearing loss or ear issues.  No visual changes.  No hormonal changes.  Review of notes does mention she had ringing when symptoms started. She has seen another ENT, Neurology and has had MRI Brain w/ & w/o and CTA of head and neck. No sinus problems. Reports her cyst of R cheek has been present for about 5 years. No change.    No recent health changes or hospitalizations.    No ear surgery, ear trauma, head trauma, ear drainage, unilateral tinnitus, unilateral ear pressure, acute hearing loss associated with episodes of dizziness.  No known family hx of early hearing loss or ear problems.    PMHx, PSHx, Meds, Allergies, SocHx, FamHx reviewed in Epic and updated appropriate to this visit    Review of Systems     Constitutional: Negative for chills and fever.      HENT: Positive for ringing in the ears.  Negative for ear discharge, ear  infection, ear pain and hearing loss.      Musc: Negative for back pain and neck pain.     Neurological: Positive for dizziness. Negative for headaches.        PE: /77   Pulse 82   Gen: female, WN, WD, NAD, cooperative and alert, good historian  Eyes: no spontaneous nystagmus, PERRL  Ears: patent w/o cerumen with translucent TMs bilaterally, normal bony landmarks, no VARSHA, no erythema (micro exam)  Nose: external wnl, septum mild R deviation, turbinates WNL, clear drainage, no visible polyps  OC/OP: tongue midline, teeth in good repair, MMM, tonsils small symmetric, no erythema or exudate  Neck: no LAD or masses, no bruits  Face: R skin subcut  oval soft tissue mass c/w cyst just inferior to malar prominence, no TTP over sinuses  Chest: equal chest excursion, no retrations  Skin: no visible concerning lesions of face, dry and intact  Lymph: no neck LAD  Neuro: CN II-VII, IX - XII intact, finger to nose testing brisk and intact, EOM intact, Romberg negative, no visible spontaneous nystagmus, no ataxia, Fukuda test negative, Matthew negative, head impulse test negative  Psych: alert & oriented x 3, reasonable, normal affect        9/14/2023    10:40 PM   HHIE-S Scores   Emotional Subscore 2   Social Subscore  8   HHIE-S Total Score  10 (Moderate Handicap)         9/14/2023    10:37 PM   Scores of the Dizziness Handicap Inventory   Total DHI Functional Score 18   Total DHI Emotional Score 8       Audio    Audiogram reviewed with patient. Normal hearing thresholds. Excellent SRT & SD Bilaterally. Tymps Type A bilaterally.    CT head w/o contrast from [facility] 7/23/23  FINDINGS:   Parenchyma: No acute infarction. No hemorrhage. No midline shift or herniation.     Extra-axial Collection:  None     Ventricular System:  Normal     Dural Venous Sinuses:  Normal     Osseous/Soft Tissue Structures:  Normal     Included Orbits: Normal     Paranasal Sinuses:  Predominantly clear     Tympanomastoid Cavities:  Normal       MRI Brain  w & w/o 11/21/23  FINDINGS:  Ventricles normal in size for age.  No hydrocephalus.     The brain appears within normal limits.  No parenchymal mass, hemorrhage, edema or recent or remote major vascular distribution infarct.  No abnormal postcontrast parenchymal or leptomeningeal enhancement.     No extra-axial blood or fluid collections.     The postcontrast images of the IAC's are somewhat limited secondary to motion artifact.  The inner ear structures maintain normal morphology and signal without abnormal enhancement.  Cranial nerve 7-8 complexes appear within normal limits.  No intra canalicular or CP angle cistern mass.     The T2 skull base flow voids are preserved.  Bone marrow signal intensity unremarkable.     Incidentally noted is a cystic lesion within the subcutaneous tissues of the right cheek.     Impression:     No evidence of acute intracranial pathology.      CTA head & neck 11/21/23  FINDINGS:  The ventricles are normal in size without evidence of hydrocephalus.     The brain appears within normal limits. No parenchymal mass, hemorrhage, edema or major vascular distribution infarct.     No extra-axial blood or fluid collections.     The cranium appears intact. Mastoid air cells and paranasal sinuses are essentially clear.        CTA:     The aortic arch maintains a normal branching pattern.     The common and internal carotid arteries are normal in course and caliber. No significant stenosis in either carotid bifurcation.     The vertebral origins are patent. The cervical vertebral arteries are normal in course and caliber. Vertebrobasilar system is within normal limits without focal abnormality.     Evaluation of the intracranial arteries is somewhat limited as there is prominent venous contamination on the study.  The anterior, middle, and posterior cerebral arteries are within normal limits, without evidence of significant stenosis, focal occlusion, or intracranial aneurysm formation.     CT  neck:     The lung apices are clear.  The aero digestive tract is patent without focal asymmetries.  The bilateral parotid glands, submandibular glands have a normal appearance.  Tiny nodules suspected in both thyroid lobes.  No lymphadenopathy seen throughout the neck.  Cystic lesion within the right pre maxillary soft tissues measuring 1.8 x 1.3 cm.     Impression:     No arterial abnormalities are seen; however, full evaluation of the intracranial vasculature is somewhat limited as there is prominent venous contamination on the study.  There is no evidence for carotid stenosis or occlusion.     No evidence for acute intracranial hemorrhage or enhancement.    Impression:   1. Vertigo  Basic vestibular evaluation      2. Ear fullness, right        3. Cyst of skin and subcutaneous tissue      Right malar area          Discussion and Plan:    Reviewed history, symptoms, exam findings, audiogram, MRI Brain and CTA Brain imaging reports and images.     Extensively reviewed potential causes of vertigo and dizziness and body systems involved in balance/ equilibrium.    Recommend scheduling a VNG (video nystagmogram). Call [phone]. Instructions for VNG testing to be provided (see below). Pending results, further recommendations may be provided.    INSTRUCTIONS FOR VNG testing:  Certain things and medications may affect the results of the VNG test.    Do NOT wear makeup (especially eyeliner or mascara)    Do NOT take any of the following medications for 48 hours prior to test: sleeping pills, tranquilizers or anti-anxiety meds (such as valium, librium, xanax), anti-histamines or over the counter cold medications, anti-dizzy medications (antivert (meclizine), dramamine),  or muscle relaxers (such as flexeril).     Seizure medications (Dilantin, phenobarbital, etc) interfere with the accuracy of the test. To obtain a more accurate test they should be stopped BUT ONLY AFTER CLEARANCE FROM THE PHYSICIAN WHO PRESCRIBED THE  MEDICATION.    Heart and/or blood pressure medications are allowed.    Do NOT eat or drink anything other than small sips of water for 4 hours prior to the VNG.    Do NOT drink alcoholic beverages for 24 hours prior to the test.    If you are diabetic, please inform the  when booking your appointment. Please schedule a time that would make fasting for 4 hours most convenient to your medication routine. If you have any concerns, check with your primary care physician.         Parts or all of this note were created by voice recognition software; typographical errors in translating may be present.

## 2024-01-23 ENCOUNTER — TELEPHONE (OUTPATIENT)
Dept: AUDIOLOGY | Facility: CLINIC | Age: 24
End: 2024-01-23
Payer: OTHER GOVERNMENT

## 2024-02-06 ENCOUNTER — PATIENT OUTREACH (OUTPATIENT)
Dept: ADMINISTRATIVE | Facility: HOSPITAL | Age: 24
End: 2024-02-06
Payer: OTHER GOVERNMENT

## 2024-02-06 ENCOUNTER — OFFICE VISIT (OUTPATIENT)
Dept: NEUROLOGY | Facility: CLINIC | Age: 24
End: 2024-02-06
Payer: OTHER GOVERNMENT

## 2024-02-06 VITALS
RESPIRATION RATE: 16 BRPM | BODY MASS INDEX: 19.12 KG/M2 | HEIGHT: 64 IN | HEART RATE: 92 BPM | SYSTOLIC BLOOD PRESSURE: 112 MMHG | WEIGHT: 112 LBS | DIASTOLIC BLOOD PRESSURE: 82 MMHG

## 2024-02-06 DIAGNOSIS — R51.9 PRESSURE IN HEAD: ICD-10-CM

## 2024-02-06 DIAGNOSIS — H93.19 TINNITUS, UNSPECIFIED LATERALITY: ICD-10-CM

## 2024-02-06 DIAGNOSIS — H57.02 ANISOCORIA: ICD-10-CM

## 2024-02-06 DIAGNOSIS — R42 DIZZINESS: Primary | ICD-10-CM

## 2024-02-06 PROCEDURE — 99213 OFFICE O/P EST LOW 20 MIN: CPT | Mod: PBBFAC | Performed by: PSYCHIATRY & NEUROLOGY

## 2024-02-06 PROCEDURE — 99999 PR PBB SHADOW E&M-EST. PATIENT-LVL III: CPT | Mod: PBBFAC,,, | Performed by: PSYCHIATRY & NEUROLOGY

## 2024-02-06 PROCEDURE — 99214 OFFICE O/P EST MOD 30 MIN: CPT | Mod: S$PBB,,, | Performed by: PSYCHIATRY & NEUROLOGY

## 2024-02-06 NOTE — PROGRESS NOTES
HPI: Rosalinda Walter is a 23 y.o. female who  presents for evaluation of dizziness.    Here for scheduled follow up    Off balance/ dizziness sensation continues a few times per day     PT done with mild improvement but not much    Eye exam is updated and all was well per her. Pupil exam was thought to be good/ no abnormalities in the pupil per optometry / glasses pending    Saw another ENT    VNG is pending    Head pressure is still noted more on the top head which is daily. No severe head pain. She thinks she may have had migraine at age 12. She had a great deal of headaches then and had to lay down with some.           Review of Systems   Constitutional:  Negative for fever.   HENT:  Positive for tinnitus.         She reports tinnitus with this starting in the right ear and still feels ear fullness and denies hearing. Saw ENT early on and was told she had no structural lesion to explain this   Eyes:  Negative for double vision.   Respiratory:  Negative for hemoptysis.    Cardiovascular:  Negative for leg swelling.   Gastrointestinal:  Negative for blood in stool.   Genitourinary:  Negative for hematuria.   Musculoskeletal:  Negative for falls.   Skin:  Negative for rash.   Neurological:  Positive for dizziness and headaches.   Endo/Heme/Allergies:  Does not bruise/bleed easily.         I have reviewed all of this patient's past medical and surgical histories as well as family and social histories and active allergies and medications as documented in the electronic medical record.          Exam:  Gen Appearance, well developed/nourished in no apparent distress  CV: 2+ distal pulses with no edema or swelling  Neuro:  MS: Awake, alert, . Sustains attention. Recent/remote memory intact, Language is full to spontaneous speech/repetition/naming/comprehension. Fund of Knowledge is full  CN: Optic discs are flat with normal vasculature, right pupil is 3mm and reactive and left pupil is 2.5mm and reactive,  Extraoccular  movements and visual fields are full. Normal facial sensation and strength, Hearing symmetric, Tongue and Palate are midline and strong. Shoulder Shrug symmetric and strong.  Motor: Normal bulk, tone, no abnormal movements. 5/5 strength bilateral upper/lower extremities with 2+ reflexes and no clonus  Sensory: symmetric toemp, and vibration, Romberg mildly positive  Cerebellar: Finger-nose,Heal-shin, Rapid alternating movements intact  Gait: Normal stance, no ataxia    Imagin2023 CT head: No acute changes    2023 MRI brain: No evidence of acute intracranial pathology.       2023 CTA head and neck:   No arterial abnormalities are seen; however, full evaluation of the intracranial vasculature is somewhat limited as there is prominent venous contamination on the study.  There is no evidence for carotid stenosis or occlusion.     No evidence for acute intracranial hemorrhage or enhancement.       Labs:  CMP, TSH, CBC noncontributory       Assessment/Plan: Rosalinda Walter is a 23 y.o. female with several months of vertigo. She also has tinnitus and right sided ear fullness and anisocoria on exam intially  I recommend:     2023 CT head unremarkable  2.   2023 MRI brain with thin IAC and CTA head and neck are reassuring (CTA mildly limited due to contrast contamination)   -She followed up with optometry who did not notice any anisocoria  (mild on my exam today, Physiologic?)  3.   Symptoms seem peripheral in nature especially with tinnitus and ear fullness otherwise. ? Meniere's disease?  ENT thought this could be MSK related initially. She denied neck pain   -PT only mildly helpful  -Pending a VNG with ENT at this time. Would defer to ENT if peripheral causes are found  -If this suggests a central cause, she will notify me and  we could try a migraine prevention medication: Pamelor? The patient does recall a history of headaches in her teens which could fit migraine criteria.       RTC 6 months

## 2024-02-06 NOTE — PROGRESS NOTES
Updates were requested from care everywhere.  Health Maintenance has been updated.  [employer] immunization registry triggered.  Immunizations were reconciled.  Per DIONE in basket message, pt declined flu vaccine 02/06/2024.

## 2024-02-20 NOTE — PROGRESS NOTES
VNG Evaluation    Referring physician:  Dr. Soares    23 y.o. female complains of dizziness and imbalance.  Symptoms are provoked by looking up and have been recurring over the past 8 months.  Rosalinda reported that symptoms last for a few seconds.  She denied taking medication that would affect the results of today's evaluation.    Gaze nystagmus was absent.  Oculomotor function was normal.  Spontaneous nystagmus was absent.  The head-hanging left Hallpike revealed <clinically insignificant> nystagmus: 1 d/s left-beating in the supine position.  This nystagmus fatigued with visual fixation.  The head-hanging right Hallpike was negative.  Static positional nystagmus was absent.  Unilateral weakness: 50% (right)  Directional preponderance: 50% (left-beating)  RW: 0 d/s (repeated with same result)  LW: 10 d/s  RC: 7 d/s   d/s  Fixation suppression was normal.    Impression:   VNG results show evidence of a right peripheral vestibular abnormality.    Recommendations:   1. Formal vestibular/balance therapy.  2. Follow-up with Dr. Soares to review the results of today's evaluation.

## 2024-02-21 ENCOUNTER — CLINICAL SUPPORT (OUTPATIENT)
Dept: AUDIOLOGY | Facility: CLINIC | Age: 24
End: 2024-02-21
Payer: OTHER GOVERNMENT

## 2024-02-21 DIAGNOSIS — H81.8X1 RIGHT-SIDED VESTIBULAR WEAKNESS: ICD-10-CM

## 2024-02-21 PROCEDURE — 92537 CALORIC VSTBLR TEST W/REC: CPT | Mod: PBBFAC | Performed by: AUDIOLOGIST

## 2024-02-21 PROCEDURE — 92540 BASIC VESTIBULAR EVALUATION: CPT | Mod: 26,S$PBB,, | Performed by: AUDIOLOGIST

## 2024-02-21 PROCEDURE — 92537 CALORIC VSTBLR TEST W/REC: CPT | Mod: 26,S$PBB,, | Performed by: AUDIOLOGIST

## 2024-02-21 PROCEDURE — 92540 BASIC VESTIBULAR EVALUATION: CPT | Mod: PBBFAC | Performed by: AUDIOLOGIST

## 2024-02-22 ENCOUNTER — TELEPHONE (OUTPATIENT)
Dept: OTOLARYNGOLOGY | Facility: CLINIC | Age: 24
End: 2024-02-22
Payer: OTHER GOVERNMENT

## 2024-02-22 NOTE — TELEPHONE ENCOUNTER
----- Message from Kerry Florentino sent at 2/22/2024 11:12 AM CST -----  Regarding: Call Back  Contact: Pt [phone]  Pt is returning a call to staff please call

## 2024-02-28 ENCOUNTER — OFFICE VISIT (OUTPATIENT)
Dept: OTOLARYNGOLOGY | Facility: CLINIC | Age: 24
End: 2024-02-28
Payer: OTHER GOVERNMENT

## 2024-02-28 VITALS — HEART RATE: 85 BPM | SYSTOLIC BLOOD PRESSURE: 118 MMHG | DIASTOLIC BLOOD PRESSURE: 84 MMHG

## 2024-02-28 DIAGNOSIS — H91.91 HEARING DECREASED, RIGHT: ICD-10-CM

## 2024-02-28 DIAGNOSIS — H81.391 OTHER PERIPHERAL VERTIGO, RIGHT EAR: ICD-10-CM

## 2024-02-28 DIAGNOSIS — H81.8X1 RIGHT-SIDED VESTIBULAR WEAKNESS: Primary | ICD-10-CM

## 2024-02-28 PROCEDURE — 99213 OFFICE O/P EST LOW 20 MIN: CPT | Mod: PBBFAC | Performed by: OTOLARYNGOLOGY

## 2024-02-28 PROCEDURE — 99213 OFFICE O/P EST LOW 20 MIN: CPT | Mod: S$PBB,,, | Performed by: OTOLARYNGOLOGY

## 2024-02-28 PROCEDURE — 99999 PR PBB SHADOW E&M-EST. PATIENT-LVL III: CPT | Mod: PBBFAC,,, | Performed by: OTOLARYNGOLOGY

## 2024-02-28 NOTE — PROGRESS NOTES
22 y/o female returns for follow up and discussion of her VNG results. VNG testing revealed right peripheral vestibular abnormality. She is still getting the sensation that the ear needs to pop. When it pops it feels the same way. She admits going to a concert and the loud music made her feel the vertigo. Symptoms still worse when leaning head back. No other new symptoms.     1/22/24 visit - HPI  23-year-old female who presents with complaint of vertigo by referral of Dr. Melina Morse.  Her symptoms started in June of 2023 when she noticed some right-sided ear fullness.  She felt the need to pop the ear to relieve the pressure.  Around that time she also started with sensation of movement.  At 1st her symptoms were for short periods of time.  Since then the episodes of feeling movement have gotten longer.  Her symptoms are worsened when she tilts her head to look up.  She does not have any trouble when she turns to the left to the right.  She denies any headache or neck pain.  She feels the room moved around her from a left to right direction.  She does recall having some allergy flare up before these symptoms started.  She has had allergies for a long time.  This episodes seem to be no worse than others.  She had moved from a different location to the [facility] right before her symptoms started.  She denies any head trauma.  No flu or cold-type symptoms.  She denies any change in her medications at that time or other medical conditions.  No family history of hearing loss or ear issues.  No visual changes.  No hormonal changes.  Review of notes does mention she had ringing when symptoms started. She has seen another ENT, Neurology and has had MRI Brain w/ & w/o and CTA of head and neck. No sinus problems. Reports her cyst of R cheek has been present for about 5 years. No change.    No recent health changes or hospitalizations.    No ear surgery, ear trauma, head trauma, ear drainage, unilateral  tinnitus, unilateral ear pressure, acute hearing loss associated with episodes of dizziness.  No known family hx of early hearing loss or ear problems.    PMHx, PSHx, Meds, Allergies, SocHx, FamHx reviewed in Epic and updated appropriate to this visit    Review of Systems     Constitutional: Negative for chills and fever.      HENT: Positive for ringing in the ears.  Negative for ear discharge, ear infection, ear pain and hearing loss.      Musc: Negative for back pain and neck pain.     Neurological: Positive for dizziness. Negative for headaches.        PE: /84   Pulse 85   Gen: female, WN, WD, NAD, cooperative and alert, good historian  Eyes: no spontaneous nystagmus, PERRL  Ears: patent w/o cerumen with translucent TMs bilaterally, normal bony landmarks, no VARSHA, no erythema (micro exam), no vertigo with insufflation  Face: R skin subcut  oval soft tissue mass c/w cyst just inferior to malar prominence, no TTP over sinuses  Respiratory: breathing comfortably, no retrations  Skin: no visible concerning lesions of face, dry and intact  Lymph: no neck LAD  Neuro: CN II-VII, IX - XII intact, finger to nose testing brisk and intact, EOM intact, Romberg negative, no visible spontaneous nystagmus, no ataxia, Fukuda test negative, Horton negative, head impulse test negative  Psych: alert & oriented x 3, reasonable, normal affect          Audio    Audiogram reviewed with patient again today. Normal hearing thresholds. Excellent SRT & SD Bilaterally. Tymps Type A bilaterally. Some asymmetry at 1000 Hz.    CT head w/o contrast from [facility] 7/23/23  FINDINGS:   Parenchyma: No acute infarction. No hemorrhage. No midline shift or herniation.     Extra-axial Collection:  None     Ventricular System:  Normal     Dural Venous Sinuses:  Normal     Osseous/Soft Tissue Structures:  Normal     Included Orbits: Normal     Paranasal Sinuses:  Predominantly clear     Tympanomastoid Cavities:  Normal       MRI Brain w & w/o  11/21/23  FINDINGS:  Ventricles normal in size for age.  No hydrocephalus.     The brain appears within normal limits.  No parenchymal mass, hemorrhage, edema or recent or remote major vascular distribution infarct.  No abnormal postcontrast parenchymal or leptomeningeal enhancement.     No extra-axial blood or fluid collections.     The postcontrast images of the IAC's are somewhat limited secondary to motion artifact.  The inner ear structures maintain normal morphology and signal without abnormal enhancement.  Cranial nerve 7-8 complexes appear within normal limits.  No intra canalicular or CP angle cistern mass.     The T2 skull base flow voids are preserved.  Bone marrow signal intensity unremarkable.     Incidentally noted is a cystic lesion within the subcutaneous tissues of the right cheek.     Impression:     No evidence of acute intracranial pathology.      CTA head & neck 11/21/23  FINDINGS:  The ventricles are normal in size without evidence of hydrocephalus.     The brain appears within normal limits. No parenchymal mass, hemorrhage, edema or major vascular distribution infarct.     No extra-axial blood or fluid collections.     The cranium appears intact. Mastoid air cells and paranasal sinuses are essentially clear.        CTA:     The aortic arch maintains a normal branching pattern.     The common and internal carotid arteries are normal in course and caliber. No significant stenosis in either carotid bifurcation.     The vertebral origins are patent. The cervical vertebral arteries are normal in course and caliber. Vertebrobasilar system is within normal limits without focal abnormality.     Evaluation of the intracranial arteries is somewhat limited as there is prominent venous contamination on the study.  The anterior, middle, and posterior cerebral arteries are within normal limits, without evidence of significant stenosis, focal occlusion, or intracranial aneurysm formation.     CT neck:      The lung apices are clear.  The aero digestive tract is patent without focal asymmetries.  The bilateral parotid glands, submandibular glands have a normal appearance.  Tiny nodules suspected in both thyroid lobes.  No lymphadenopathy seen throughout the neck.  Cystic lesion within the right pre maxillary soft tissues measuring 1.8 x 1.3 cm.     Impression:     No arterial abnormalities are seen; however, full evaluation of the intracranial vasculature is somewhat limited as there is prominent venous contamination on the study.  There is no evidence for carotid stenosis or occlusion.     No evidence for acute intracranial hemorrhage or enhancement.    Impression:   1. Right-sided vestibular weakness        2. Hearing decreased, right      within normal limits hearing but asymmetric thresholds Right less than Left in Mid freq      3. Other peripheral vertigo, right ear  CT Temporal Bone without contrast          Discussion and Plan:    Reviewed VNG results and audio. Discussed potential causes of her vertigo.    Recommend CT Temporal bones - thin cuts to evaluate SCC.    Based on results, further recommendations to follow. Expectations discussed.      Parts or all of this note were created by voice recognition software; typographical errors in translating may be present.

## 2024-02-28 NOTE — PATIENT INSTRUCTIONS
Reviewed VNG results and audio. Discussed potential causes of her vertigo.    Recommend CT Temporal bones.    Based on results, further recommendations to follow. Expectations discussed.

## 2024-03-01 ENCOUNTER — ON-DEMAND VIRTUAL (OUTPATIENT)
Dept: URGENT CARE | Facility: CLINIC | Age: 24
End: 2024-03-01
Payer: OTHER GOVERNMENT

## 2024-03-01 DIAGNOSIS — K52.9 GASTROENTERITIS: Primary | ICD-10-CM

## 2024-03-01 PROCEDURE — 99213 OFFICE O/P EST LOW 20 MIN: CPT | Mod: 95,,, | Performed by: NURSE PRACTITIONER

## 2024-03-01 RX ORDER — ONDANSETRON 8 MG/1
8 TABLET, ORALLY DISINTEGRATING ORAL EVERY 8 HOURS PRN
Qty: 12 TABLET | Refills: 0 | Status: SHIPPED | OUTPATIENT
Start: 2024-03-01 | End: 2024-05-14

## 2024-03-01 NOTE — PROGRESS NOTES
Subjective:      Patient ID: Rosalinda Walter is a 23 y.o. female.    Vitals:  vitals were not taken for this visit.     Chief Complaint: Abdominal Pain      Visit Type: TELE AUDIOVISUAL    Present with the patient at the time of consultation: TELEMED PRESENT WITH PATIENT: None    History reviewed. No pertinent past medical history.  History reviewed. No pertinent surgical history.  Review of patient's allergies indicates:  No Known Allergies  No current outpatient medications on file prior to visit.     No current facility-administered medications on file prior to visit.     History reviewed. No pertinent family history.    Medications Ordered                [id number] - [place], [place] - [address] AT [facility] [address], [place] [address]    Telephone: [phone]   Fax: [phone]   Hours: Not open 24 hours                         E-Prescribed (1 of 1)              ondansetron (ZOFRAN-ODT) 8 MG TbDL    Sig: Take 1 tablet (8 mg total) by mouth every 8 (eight) hours as needed (nausea).       Start: 3/1/24     Quantity: 12 tablet Refills: 0                           Ohs Peq Odvv Intake    3/1/2024 12:39 PM CST - Filed by Patient   What is your current physical address in the event of a medical emergency? [address] [place] [id number]   Are you able to take your vital signs? No   Please attach any relevant images or files          Woke up with n/v/d. Unable to tolerate PO intake. No known sick contacts. No food borne illness. Seeking treatment options.    Abdominal Pain  Associated symptoms include diarrhea, nausea and vomiting. Pertinent negatives include no fever or hematochezia.       Constitution: Negative for chills and fever.   Gastrointestinal:  Positive for abdominal pain, nausea, vomiting and diarrhea. Negative for bright red blood in stool.        Objective:   The physical exam was conducted virtually.  Physical Exam    Constitutional: She is oriented to person, place, and time. She does not appear ill. No distress.   HENT:   Head: Normocephalic and atraumatic.   Nose: Nose normal.   Eyes: Extraocular movement intact   Pulmonary/Chest: Effort normal.   Abdominal: Normal appearance.   Musculoskeletal: Normal range of motion.         General: Normal range of motion.   Neurological: no focal deficit. She is alert and oriented to person, place, and time.   Psychiatric: Her behavior is normal. Mood normal.   Vitals reviewed.      Assessment:     1. Gastroenteritis        Plan:   Patient encouraged to monitor symptoms closely and instructed to follow-up for new or worsening symptoms. Further, in-person, evaluation may be necessary for continued treatment. Please follow up with your primary care doctor or specialist as needed. Verbally discussed plan. Patient confirms understanding and is in agreement with treatment and plan.     You must understand that you've received a Virtual Care evaluation only and that you may be released before all your medical problems are known or treated. You, the patient, will arrange for follow up care as instructed.      Gastroenteritis  -     ondansetron (ZOFRAN-ODT) 8 MG TbDL; Take 1 tablet (8 mg total) by mouth every 8 (eight) hours as needed (nausea).  Dispense: 12 tablet; Refill: 0        Patient Instructions   Recommendations:   . Try to stay hydrated (Water, Diluted fruit juices, Gatorade, Pedialyte, Popsicles) as best as you can.      . Stick to a bland diet. Avoid spicy, greasy, fatty foods or dairy products until symptoms improve then advance diet as tolerated.     . Pepto-Bismol, Imodium, or Metamucil over the counter as directed for diarrhea relief.     . Over the counter Probiotics, as directed, may be beneficial.

## 2024-03-05 ENCOUNTER — OFFICE VISIT (OUTPATIENT)
Dept: FAMILY MEDICINE | Facility: CLINIC | Age: 24
End: 2024-03-05
Payer: OTHER GOVERNMENT

## 2024-03-05 DIAGNOSIS — R42 VERTIGO: Primary | ICD-10-CM

## 2024-03-05 DIAGNOSIS — K21.9 GASTROESOPHAGEAL REFLUX DISEASE, UNSPECIFIED WHETHER ESOPHAGITIS PRESENT: ICD-10-CM

## 2024-03-05 DIAGNOSIS — M54.2 CERVICAL PAIN: ICD-10-CM

## 2024-03-05 PROCEDURE — 99214 OFFICE O/P EST MOD 30 MIN: CPT | Mod: 95,,, | Performed by: FAMILY MEDICINE

## 2024-03-05 NOTE — PROGRESS NOTES
The patient location is: [place]  The chief complaint leading to consultation is: dizziness  Visit type: audiovisual  Total time spent with patient: 10 mins  Each patient to whom he or she provides medical services by telemedicine is:  (1) informed of the relationship between the physician and patient and the respective role of any other health care provider with respect to management of the patient; and (2) notified that he or she may decline to receive medical services by telemedicine and may withdraw from such care at any time.      Subjective:       Patient ID: Rosalinda Walter is a 23 y.o. female.    Chief Complaint: No chief complaint on file.      HPI  23-year-old female presents for dizziness follow-up.  Patient is neuro and ENT.  Had recent CT scan which may show the cause of her vertigo.  We will follow-up with ENT.  States she had stomach pain.    Review of Systems   Constitutional: Negative.    HENT: Negative.     Respiratory: Negative.     Cardiovascular: Negative.    Gastrointestinal: Negative.    Endocrine: Negative.    Genitourinary: Negative.    Musculoskeletal: Negative.    Neurological:  Positive for dizziness.   Psychiatric/Behavioral: Negative.            No past medical history on file.  No past surgical history on file.  No family history on file.  Social History     Socioeconomic History    Marital status:    Tobacco Use    Smoking status: Never    Smokeless tobacco: Never   Substance and Sexual Activity    Alcohol use: Not Currently    Drug use: Never    Sexual activity: Yes     Partners: Female     Social Determinants of Health     Financial Resource Strain: Low Risk  (12/5/2023)    Overall Financial Resource Strain (CARDIA)     Difficulty of Paying Living Expenses: Not hard at all   Food Insecurity: No Food Insecurity (12/5/2023)    Hunger Vital Sign     Worried About Running Out of Food in the Last Year: Never true     Ran Out of Food in the Last Year: Never true   Transportation Needs: No  Transportation Needs (12/5/2023)    PRAPARE - Transportation     Lack of Transportation (Medical): No     Lack of Transportation (Non-Medical): No   Physical Activity: Inactive (12/5/2023)    Exercise Vital Sign     Days of Exercise per Week: 0 days     Minutes of Exercise per Session: 0 min   Stress: No Stress Concern Present (12/5/2023)    [employer] - Occupational Stress Questionnaire     Feeling of Stress : Not at all   Social Connections: Moderately Isolated (12/5/2023)    Social Connection and Isolation Panel [NHANES]     Frequency of Communication with Friends and Family: More than three times a week     Frequency of Social Gatherings with Friends and Family: Once a week     Attends Gnosticism Services: Never     Active Member of Clubs or Organizations: No     Attends Club or Organization Meetings: Never     Marital Status:    Housing Stability: Low Risk  (12/5/2023)    Housing Stability Vital Sign     Unable to Pay for Housing in the Last Year: No     Number of Places Lived in the Last Year: 2     Unstable Housing in the Last Year: No       Current Outpatient Medications:     ondansetron (ZOFRAN-ODT) 8 MG TbDL, Take 1 tablet (8 mg total) by mouth every 8 (eight) hours as needed (nausea)., Disp: 12 tablet, Rfl: 0    pantoprazole (PROTONIX) 20 MG tablet, Take 1 tablet (20 mg total) by mouth once daily., Disp: 30 tablet, Rfl: 2   Objective:      There were no vitals filed for this visit.    Physical Exam  Constitutional:       General: She is not in acute distress.     Appearance: She is not diaphoretic.   HENT:      Head: Normocephalic and atraumatic.   Eyes:      Conjunctiva/sclera: Conjunctivae normal.   Pulmonary:      Effort: Pulmonary effort is normal.   Musculoskeletal:      Cervical back: Neck supple.   Skin:     Findings: No rash.   Neurological:      Mental Status: She is alert and oriented to person, place, and time.   Psychiatric:         Behavior: Behavior  normal.         Thought Content: Thought content normal.         Judgment: Judgment normal.            Assessment:       1. Vertigo    2. Cervical pain    3. Gastroesophageal reflux disease, unspecified whether esophagitis present        Plan:       Vertigo  -     Vitamin B12; Future; Expected date: 03/05/2024  -     ZINC; Future; Expected date: 03/05/2024  -     Folate; Future; Expected date: 03/05/2024    Cervical pain  -     Vitamin B12; Future; Expected date: 03/05/2024  -     ZINC; Future; Expected date: 03/05/2024  -     Folate; Future; Expected date: 03/05/2024    Gastroesophageal reflux disease, unspecified whether esophagitis present  -     pantoprazole (PROTONIX) 20 MG tablet; Take 1 tablet (20 mg total) by mouth once daily.  Dispense: 30 tablet; Refill: 2    Continue follow-up with specialists.  Added labs to check.  Will try Protonix for possible GERD.  Future Appointments   Date Time Provider Department Center   4/9/2024  9:00 AM Allyn Dupree AU.D [facility] AUDIO [facility]   4/9/2024 10:30 AM Tarun Harley MD [facility] ENT [facility]   8/6/2024  1:00 PM Marshall Merritt MD [facility]O [facility]                   Patient note was created using SPARQCode.  Any errors in syntax or even information may not have been identified and edited on initial review prior to signing this note.

## 2024-03-06 ENCOUNTER — CLINICAL SUPPORT (OUTPATIENT)
Dept: FAMILY MEDICINE | Facility: CLINIC | Age: 24
End: 2024-03-06
Payer: OTHER GOVERNMENT

## 2024-03-06 ENCOUNTER — HOSPITAL ENCOUNTER (OUTPATIENT)
Dept: RADIOLOGY | Facility: HOSPITAL | Age: 24
Discharge: HOME | End: 2024-03-06
Attending: OTOLARYNGOLOGY
Payer: OTHER GOVERNMENT

## 2024-03-06 DIAGNOSIS — H81.391 OTHER PERIPHERAL VERTIGO, RIGHT EAR: ICD-10-CM

## 2024-03-06 DIAGNOSIS — K21.9 GASTROESOPHAGEAL REFLUX DISEASE, UNSPECIFIED WHETHER ESOPHAGITIS PRESENT: ICD-10-CM

## 2024-03-06 DIAGNOSIS — M54.2 CERVICAL PAIN: Primary | ICD-10-CM

## 2024-03-06 PROCEDURE — 70480 CT ORBIT/EAR/FOSSA W/O DYE: CPT | Mod: TC

## 2024-03-06 PROCEDURE — 99999 PR PBB SHADOW E&M-EST. PATIENT-LVL I: CPT | Mod: PBBFAC,,,

## 2024-03-06 PROCEDURE — 99211 OFF/OP EST MAY X REQ PHY/QHP: CPT | Mod: PBBFAC,25,PO

## 2024-03-06 PROCEDURE — 70480 CT ORBIT/EAR/FOSSA W/O DYE: CPT | Mod: 26,,, | Performed by: RADIOLOGY

## 2024-03-06 PROCEDURE — 99499 UNLISTED E&M SERVICE: CPT | Mod: S$PBB,,, | Performed by: FAMILY MEDICINE

## 2024-03-06 RX ORDER — PANTOPRAZOLE SODIUM 20 MG/1
20 TABLET, DELAYED RELEASE ORAL DAILY
Qty: 30 TABLET | Refills: 2 | Status: SHIPPED | OUTPATIENT
Start: 2024-03-06 | End: 2024-05-14

## 2024-03-25 ENCOUNTER — LAB VISIT (OUTPATIENT)
Dept: LAB | Facility: OTHER | Age: 24
End: 2024-03-25
Attending: FAMILY MEDICINE
Payer: OTHER GOVERNMENT

## 2024-03-25 DIAGNOSIS — R42 VERTIGO: ICD-10-CM

## 2024-03-25 DIAGNOSIS — M54.2 CERVICAL PAIN: ICD-10-CM

## 2024-03-25 LAB
FOLATE SERPL-MCNC: 9.2 NG/ML (ref 4–24)
VIT B12 SERPL-MCNC: 352 PG/ML (ref 210–950)

## 2024-03-25 PROCEDURE — 36415 COLL VENOUS BLD VENIPUNCTURE: CPT | Performed by: FAMILY MEDICINE

## 2024-03-25 PROCEDURE — 84630 ASSAY OF ZINC: CPT | Performed by: FAMILY MEDICINE

## 2024-03-25 PROCEDURE — 82607 VITAMIN B-12: CPT | Performed by: FAMILY MEDICINE

## 2024-03-25 PROCEDURE — 82746 ASSAY OF FOLIC ACID SERUM: CPT | Performed by: FAMILY MEDICINE

## 2024-03-28 LAB — ZINC SERPL-MCNC: 81 UG/DL (ref 60–130)

## 2024-04-09 ENCOUNTER — CLINICAL SUPPORT (OUTPATIENT)
Dept: AUDIOLOGY | Facility: CLINIC | Age: 24
End: 2024-04-09
Payer: OTHER GOVERNMENT

## 2024-04-09 ENCOUNTER — OFFICE VISIT (OUTPATIENT)
Dept: OTOLARYNGOLOGY | Facility: CLINIC | Age: 24
End: 2024-04-09
Payer: OTHER GOVERNMENT

## 2024-04-09 DIAGNOSIS — R42 VERTIGO: Primary | ICD-10-CM

## 2024-04-09 DIAGNOSIS — H93.8X1 EAR FULLNESS, RIGHT: ICD-10-CM

## 2024-04-09 DIAGNOSIS — H81.8X1 RIGHT-SIDED VESTIBULAR WEAKNESS: ICD-10-CM

## 2024-04-09 DIAGNOSIS — H81.8X9 OTOLITH DISEASE, UNSPECIFIED LATERALITY: Primary | ICD-10-CM

## 2024-04-09 PROCEDURE — 92519 VEMP TST I&R CERVICAL&OCULAR: CPT | Mod: PBBFAC | Performed by: AUDIOLOGIST

## 2024-04-09 PROCEDURE — 92519 VEMP TST I&R CERVICAL&OCULAR: CPT | Mod: S$PBB,,, | Performed by: AUDIOLOGIST

## 2024-04-09 PROCEDURE — 99999 PR PBB SHADOW E&M-EST. PATIENT-LVL II: CPT | Mod: PBBFAC,,, | Performed by: OTOLARYNGOLOGY

## 2024-04-09 PROCEDURE — 99212 OFFICE O/P EST SF 10 MIN: CPT | Mod: PBBFAC,25 | Performed by: OTOLARYNGOLOGY

## 2024-04-09 PROCEDURE — 99214 OFFICE O/P EST MOD 30 MIN: CPT | Mod: S$PBB,,, | Performed by: OTOLARYNGOLOGY

## 2024-04-09 NOTE — PROGRESS NOTES
VEMP EVALUATION:     cVEMP testing was completed. Stimuli were presented at 95 dBnHL, 75 dBnHL, and 55 dBnHL for each ear. Repeatable responses were obtained at 95 dBnHL for both ears and latencies were marked on a summed and averaged waveform and were within normal limits (normal < 35%).  No repeatable responses were obtained at 75 dBnHL or 55 dBnHL for either ear.    oVEMP testing was completed. Stimuli were presented at 97 dBnHL at 500 and 4000 Hz for each ear. Repeatable responses were obtained at 97 dBnHL at 500 Hz for both ears and latencies were marked on a summed and averaged waveform and were within normal limits (normal < 35%). Peak to peak amplitude was less than 17.1 microvolts.  No repeatable responses were obtained at 4000 Hz for either ear.    IMPRESSION:   Normal cVEMP.  A normal cVEMP suggests normal saccule/inferior nerve function.     Normal oVEMP.  A normal oVEMP suggests normal utricle/superior nerve function.     RECOMMENDATION:   Follow-up with Dr. Harley.

## 2024-04-09 NOTE — PROGRESS NOTES
Subjective     Patient ID: Rosalinda Walter is a 23 y.o. female.    Chief Complaint: Follow-up (Vemp results )    Follow-up  Associated symptoms include vertigo. Pertinent negatives include no chest pain, chills, fever or sore throat.        Rosalinda Walter is a 23 y.o. female referred by Dr. Soares for evaluation of vertigo.  Patient reports 1 year history of vertigo episodes.  They occur 5 to 6 times a day.  She is states they lasts seconds.  Denies any specific triggers.  Denies induction with vestibular test.  States sometimes dizziness can last all day long.  Describes dizziness as a non room spinning sense of motion.  She states about a year ago she had a ear infection was treated with antibiotics.  She has had a VNG which shows a right side 50% caloric abnormality.  She also reports chronic right ear fullness.  Denies any history of migraines.  Denies family history of migraines.  Denies autophony.  Denies Tullio phenomenon    Review of Systems   Constitutional:  Negative for chills and fever.   HENT:  Negative for sore throat and trouble swallowing.    Respiratory:  Negative for apnea and chest tightness.    Cardiovascular:  Negative for chest pain.   Neurological:  Positive for dizziness and vertigo.          Objective     Physical Exam  Vitals and nursing note reviewed.   Constitutional:       Appearance: Normal appearance.   HENT:      Head: Normocephalic and atraumatic.      Right Ear: Tympanic membrane, ear canal and external ear normal. There is no impacted cerumen.      Left Ear: Tympanic membrane, ear canal and external ear normal. There is no impacted cerumen.   Eyes:      Extraocular Movements:      Right eye: Normal extraocular motion.      Left eye: Normal extraocular motion and no nystagmus.   Neurological:      Mental Status: She is alert.      Comments:   Head Thrust: negative  Canalith testing:  Matthew-Hallpike: negative AU  Supine Roll: negative AU  Head Hang: negative       Data  Reviewed:      Audiogram tracings independently reviewed and discussed with patient shows normal thresholds, WRS, tympanograms, and reflexes AU    24:             VNG Evaluation     Referring physician:  Dr. Soares     23 y.o. female complains of dizziness and imbalance.  Symptoms are provoked by looking up and have been recurring over the past 8 months.  Rosalinda reported that symptoms last for a few seconds.  She denied taking medication that would affect the results of today's evaluation.     Gaze nystagmus was absent.  Oculomotor function was normal.  Spontaneous nystagmus was absent.  The head-hanging left Hallpike revealed <clinically insignificant> nystagmus: 1 d/s left-beating in the supine position.  This nystagmus fatigued with visual fixation.  The head-hanging right Hallpike was negative.  Static positional nystagmus was absent.  Unilateral weakness: 50% (right)  Directional preponderance: 50% (left-beating)  RW: 0 d/s (repeated with same result)  LW: 10 d/s  RC: 7 d/s   d/s  Fixation suppression was normal.     Impression:   VNG results show evidence of a right peripheral vestibular abnormality.        VEMP EVALUATION:      cVEMP testing was completed. Stimuli were presented at 95 dBnHL, 75 dBnHL, and 55 dBnHL for each ear. Repeatable responses were obtained at 95 dBnHL for both ears and latencies were marked on a summed and averaged waveform and were within normal limits (normal < 35%).  No repeatable responses were obtained at 75 dBnHL or 55 dBnHL for either ear.     oVEMP testing was completed. Stimuli were presented at 97 dBnHL at 500 and 4000 Hz for each ear. Repeatable responses were obtained at 97 dBnHL at 500 Hz for both ears and latencies were marked on a summed and averaged waveform and were within normal limits (normal < 35%). Peak to peak amplitude was less than 17.1 microvolts.  No repeatable responses were obtained at 4000 Hz for either ear.     IMPRESSION:   Normal cVEMP.  A normal  cVEMP suggests normal saccule/inferior nerve function.      Normal oVEMP.  A normal oVEMP suggests normal utricle/superior nerve function.        MRI brain [id number]  independently reviewed by me.  No evidence of IAC or inner ear anomaly, no evidence of acoustic neuroma.       CT temporal bones image independently reviewed by me and show:  thin bone around superior canal but no dehiscence       Assessment and Plan     1. Vertigo    2. Right-sided vestibular weakness    3. 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 Per neurology notes reports hx of headaches as a teenager.    Will try trial of betahistine x 3 mo.  If no improvement may do trial of migraine preventatives pamelor, effexor etc.    Tarun Harley MD  Otology, Neurotology, and Skull Base Surgery  Department of Otorhinolaryngology  [facility]           No follow-ups on file.

## 2024-05-06 ENCOUNTER — PATIENT MESSAGE (OUTPATIENT)
Dept: ADMINISTRATIVE | Facility: HOSPITAL | Age: 24
End: 2024-05-06
Payer: OTHER GOVERNMENT

## 2024-05-06 ENCOUNTER — PATIENT OUTREACH (OUTPATIENT)
Dept: ADMINISTRATIVE | Facility: HOSPITAL | Age: 24
End: 2024-05-06
Payer: OTHER GOVERNMENT

## 2024-05-14 ENCOUNTER — OFFICE VISIT (OUTPATIENT)
Dept: NEUROLOGY | Facility: CLINIC | Age: 24
End: 2024-05-14
Payer: OTHER GOVERNMENT

## 2024-05-14 DIAGNOSIS — R42 DIZZINESS: ICD-10-CM

## 2024-05-14 DIAGNOSIS — R51.9 PRESSURE IN HEAD: ICD-10-CM

## 2024-05-14 DIAGNOSIS — M54.2 CERVICALGIA: Primary | ICD-10-CM

## 2024-05-14 PROCEDURE — 99214 OFFICE O/P EST MOD 30 MIN: CPT | Mod: 95,,, | Performed by: PSYCHIATRY & NEUROLOGY

## 2024-05-14 RX ORDER — NORTRIPTYLINE HYDROCHLORIDE 10 MG/1
CAPSULE ORAL
Qty: 60 CAPSULE | Refills: 11 | Status: SHIPPED | OUTPATIENT
Start: 2024-05-14

## 2024-05-14 NOTE — PROGRESS NOTES
"The patient location is: dizziness  The chief complaint leading to consultation is: home    Visit type: audiovisual    Face to Face time with patient: 9 minutes  19 minutes of total time spent on the encounter, which includes face to face time and non-face to face time preparing to see the patient (eg, review of tests), Obtaining and/or reviewing separately obtained history, Documenting clinical information in the electronic or other health record, Independently interpreting results (not separately reported) and communicating results to the patient/family/caregiver, or Care coordination (not separately reported).         Each patient to whom he or she provides medical services by telemedicine is:  (1) informed of the relationship between the physician and patient and the respective role of any other health care provider with respect to management of the patient; and (2) notified that he or she may decline to receive medical services by telemedicine and may withdraw from such care at any time.    Notes:       HPI: Rosalinda Walter is a 23 y.o. female with dizziness.    Here for 6  months  follow up    Plan per ENT: "Will try trial of betahistine x 3 mo.  If no improvement may do trial of migraine preventatives pamelor, effexor etc"    She has not noted a benefit with Betahistine after being this for 3 months  Off balance/ dizziness     VNG  revealed "VNG testing revealed right peripheral vestibular abnormality " per ENT and she had hearing decreased on the right per audiology       Head pressure continues and is bothersome. This occurs daily and she feels like she has some posterior head pain/ pressure     She notes some numbness in the upper neck    She does have pain radiating down the arms for a couple of months    PT did suggest her neck could be involved prior          Review of Systems   Constitutional:  Negative for fever.   HENT:  Positive for tinnitus.    Eyes:  Negative for double vision.   Respiratory:  " Negative for hemoptysis.    Cardiovascular:  Negative for leg swelling.   Gastrointestinal:  Negative for blood in stool.   Genitourinary:  Negative for hematuria.   Musculoskeletal:  Negative for falls.   Skin:  Negative for rash.   Neurological:  Positive for dizziness and headaches.   Endo/Heme/Allergies:  Does not bruise/bleed easily.         I have reviewed all of this patient's past medical and surgical histories as well as family and social histories and active allergies and medications as documented in the electronic medical record.          Exam:  Gen Appearance, well developed/nourished in no apparent distress    Neuro:  MS: Sustains attention. Recent/remote memory intact, Language is full to spontaneous speech/repetition/naming/comprehension. Fund of Knowledge is full  CN: Extraocular movements and visual fields are full. Normal facial  strength  Motor: Normal bulk, moves all extremities well  Gait: Normal stance    The remainder of the exam is limited due to telemedicine nature of the visit      Imagin2023 CT head: No acute changes    2023 MRI brain: No evidence of acute intracranial pathology.       2023 CTA head and neck:   No arterial abnormalities are seen; however, full evaluation of the intracranial vasculature is somewhat limited as there is prominent venous contamination on the study.  There is no evidence for carotid stenosis or occlusion.     No evidence for acute intracranial hemorrhage or enhancement.       Labs:  CMP, TSH, CBC noncontributory       Assessment/Plan: Rosalinda Walter is a 23 y.o. female with several months of vertigo. She also has tinnitus and right sided ear fullness and anisocoria on exam intially  I recommend:     2023 CT head unremarkable  2.   2023 MRI brain with thin IAC and CTA head and neck are reassuring (CTA mildly limited due to contrast contamination)   -She followed up with optometry who did not notice any anisocoria  (mild on my exam priof,  "Physiologic?)  3.   Symptoms seem peripheral in nature especially with tinnitus and ear fullness otherwise. ? Meniere's disease?  ENT thought this could be MSK related initially.   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 Per neurology notes reports hx of headaches as a teenager.   Will try trial of betahistine x 3 mo.  If no improvement may do trial of migraine preventatives pamelor, effexor etc."  -She continues with head pressure and now cervical radicular pain. Will start  Pamelor trial Unless sedation, mood changes or other side effects.The patient does recall a history of headaches in her teens which could fit migraine criteria.       RTC 6 months      "

## 2024-05-17 ENCOUNTER — OFFICE VISIT (OUTPATIENT)
Dept: INTERNAL MEDICINE | Facility: CLINIC | Age: 24
End: 2024-05-17
Payer: OTHER GOVERNMENT

## 2024-05-17 VITALS
HEIGHT: 64 IN | RESPIRATION RATE: 18 BRPM | SYSTOLIC BLOOD PRESSURE: 118 MMHG | OXYGEN SATURATION: 99 % | WEIGHT: 110.31 LBS | TEMPERATURE: 98 F | HEART RATE: 87 BPM | DIASTOLIC BLOOD PRESSURE: 82 MMHG | BODY MASS INDEX: 18.83 KG/M2

## 2024-05-17 DIAGNOSIS — G43.809 OTHER MIGRAINE WITHOUT STATUS MIGRAINOSUS, NOT INTRACTABLE: ICD-10-CM

## 2024-05-17 DIAGNOSIS — R09.89 THROAT FULLNESS: ICD-10-CM

## 2024-05-17 DIAGNOSIS — Z00.00 ANNUAL PHYSICAL EXAM: Primary | ICD-10-CM

## 2024-05-17 PROCEDURE — 99999 PR PBB SHADOW E&M-EST. PATIENT-LVL IV: CPT | Mod: PBBFAC,,, | Performed by: INTERNAL MEDICINE

## 2024-05-17 PROCEDURE — 99214 OFFICE O/P EST MOD 30 MIN: CPT | Mod: S$PBB,,, | Performed by: INTERNAL MEDICINE

## 2024-05-17 PROCEDURE — 99214 OFFICE O/P EST MOD 30 MIN: CPT | Mod: PBBFAC,PO | Performed by: INTERNAL MEDICINE

## 2024-05-17 NOTE — PROGRESS NOTES
Subjective     Patient ID: Rosalinda Walter is a 23 y.o. female.    Chief Complaint: Establish Care    HPI  23 y.o. Female here for annual exam.     Vaccines: Influenza (2023); Tetanus (2017)  Eye exam: current  Gyn exam: needs    Exercise: no  Diet: regular   LMP: 5/6/24    Past Medical History:  No date: Migraines  No past surgical history on file.  Social History    Socioeconomic History      Marital status:     Tobacco Use      Smoking status: Never      Smokeless tobacco: Never    Substance and Sexual Activity      Alcohol use: Yes        Comment: social      Drug use: Never      Sexual activity: Yes        Partners: Female    Social History Narrative      Homemaker     Social Determinants of Health  Financial Resource Strain: Low Risk  (12/5/2023)      Overall Financial Resource Strain (CARDIA)          Difficulty of Paying Living Expenses: Not hard at all  Food Insecurity: No Food Insecurity (12/5/2023)      Hunger Vital Sign          Worried About Running Out of Food in the Last Year: Never true          Ran Out of Food in the Last Year: Never true  Transportation Needs: No Transportation Needs (12/5/2023)      PRAPARE - Transportation          Lack of Transportation (Medical): No          Lack of Transportation (Non-Medical): No  Physical Activity: Inactive (12/5/2023)      Exercise Vital Sign          Days of Exercise per Week: 0 days          Minutes of Exercise per Session: 0 min  Stress: No Stress Concern Present (12/5/2023)      [employer] - Occupational Stress Questionnaire          Feeling of Stress : Not at all  Housing Stability: Low Risk  (12/5/2023)      Housing Stability Vital Sign          Unable to Pay for Housing in the Last Year: No          Number of Places Lived in the Last Year: 2          Unstable Housing in the Last Year: No  Review of patient's allergies indicates:  No Known Allergies  Rosalinda Walter had no medications administered during this  visit.  Review of Systems   Constitutional:  Negative for activity change, appetite change, chills, diaphoresis, fatigue, fever and unexpected weight change.   HENT:  Negative for nasal congestion, mouth sores, postnasal drip, rhinorrhea, sinus pressure/congestion, sneezing, sore throat, trouble swallowing and voice change.    Eyes:  Negative for pain, discharge and visual disturbance.   Respiratory:  Negative for cough, shortness of breath and wheezing.    Cardiovascular:  Negative for chest pain, palpitations and leg swelling.   Gastrointestinal:  Negative for abdominal pain, blood in stool, constipation, diarrhea, nausea and vomiting.   Endocrine: Negative for cold intolerance and heat intolerance.   Genitourinary:  Negative for difficulty urinating, dysuria, frequency, hematuria and urgency.   Musculoskeletal:  Negative for arthralgias and myalgias.   Integumentary:  Negative for rash and wound.   Allergic/Immunologic: Negative for environmental allergies and food allergies.   Neurological:  Negative for dizziness, tremors, seizures, syncope, weakness, light-headedness and headaches.   Hematological:  Negative for adenopathy. Does not bruise/bleed easily.   Psychiatric/Behavioral:  Negative for confusion and sleep disturbance. The patient is not nervous/anxious.           Objective     Physical Exam  Vitals and nursing note reviewed.   Constitutional:       General: She is not in acute distress.     Appearance: Normal appearance. She is well-developed. She is not diaphoretic.   HENT:      Head: Normocephalic and atraumatic.      Right Ear: External ear normal.      Left Ear: External ear normal.      Nose: Nose normal.      Mouth/Throat:      Pharynx: No oropharyngeal exudate.   Eyes:      General: No scleral icterus.        Right eye: No discharge.         Left eye: No discharge.      Conjunctiva/sclera: Conjunctivae normal.      Pupils: Pupils are equal, round, and reactive to light.   Neck:      Thyroid: No  thyromegaly.      Vascular: No JVD.   Cardiovascular:      Rate and Rhythm: Normal rate and regular rhythm.      Pulses: Normal pulses.      Heart sounds: Normal heart sounds. No murmur heard.  Pulmonary:      Effort: Pulmonary effort is normal. No respiratory distress.      Breath sounds: Normal breath sounds. No wheezing, rhonchi or rales.   Chest:      Chest wall: No tenderness.   Abdominal:      General: Bowel sounds are normal. There is no distension.      Palpations: Abdomen is soft.      Tenderness: There is no abdominal tenderness. There is no guarding or rebound.   Musculoskeletal:      Cervical back: Neck supple.      Right lower leg: No edema.      Left lower leg: No edema.   Lymphadenopathy:      Cervical: No cervical adenopathy.   Skin:     General: Skin is warm and dry.      Coloration: Skin is not pale.      Findings: No rash.   Neurological:      General: No focal deficit present.      Mental Status: She is alert and oriented to person, place, and time.      Gait: Gait normal.   Psychiatric:         Behavior: Behavior normal.         Thought Content: Thought content normal.         Judgment: Judgment normal.            Assessment and Plan     1. Annual physical exam  -     Ambulatory referral/consult to Gynecology; Future; Expected date: 05/24/2024  -     CBC Auto Differential; Future; Expected date: 05/17/2024  -     COMPREHENSIVE METABOLIC PANEL; Future; Expected date: 05/17/2024  -     TSH; Future; Expected date: 05/17/2024  -     Lipid Panel; Future; Expected date: 05/17/2024  -     Urinalysis; Future  -     VITAMIN B1; Future; Expected date: 05/17/2024    2. Throat fullness  -     US Soft Tissue Head Neck; Future; Expected date: 05/17/2024    3. Other migraine without status migrainosus, not intractable        Blood work ordered    Migraines- starting Pamelor per Neuro    Throat fullness- no dysphagia    -Thyroid US and if normal will refer back to ENT

## 2024-05-20 DIAGNOSIS — Z11.8 SCREENING FOR CHLAMYDIAL DISEASE: Primary | ICD-10-CM

## 2024-05-20 PROBLEM — G43.909 MIGRAINE WITHOUT STATUS MIGRAINOSUS, NOT INTRACTABLE: Status: ACTIVE | Noted: 2024-05-20

## 2024-05-21 ENCOUNTER — HOSPITAL ENCOUNTER (OUTPATIENT)
Dept: RADIOLOGY | Facility: HOSPITAL | Age: 24
Discharge: HOME | End: 2024-05-21
Attending: INTERNAL MEDICINE
Payer: OTHER GOVERNMENT

## 2024-05-21 DIAGNOSIS — R09.89 THROAT FULLNESS: ICD-10-CM

## 2024-05-21 PROCEDURE — 76536 US EXAM OF HEAD AND NECK: CPT | Mod: 26,,, | Performed by: RADIOLOGY

## 2024-05-21 PROCEDURE — 76536 US EXAM OF HEAD AND NECK: CPT | Mod: TC

## 2024-05-23 ENCOUNTER — LAB VISIT (OUTPATIENT)
Dept: LAB | Facility: OTHER | Age: 24
End: 2024-05-23
Attending: INTERNAL MEDICINE
Payer: OTHER GOVERNMENT

## 2024-05-23 DIAGNOSIS — Z11.8 SCREENING FOR CHLAMYDIAL DISEASE: ICD-10-CM

## 2024-05-23 DIAGNOSIS — Z00.00 ANNUAL PHYSICAL EXAM: ICD-10-CM

## 2024-05-23 LAB
BACTERIA #/AREA URNS HPF: NORMAL /HPF
BILIRUB UR QL STRIP: NEGATIVE
CLARITY UR: CLEAR
COLOR UR: COLORLESS
GLUCOSE UR QL STRIP: NEGATIVE
HGB UR QL STRIP: NEGATIVE
KETONES UR QL STRIP: NEGATIVE
LEUKOCYTE ESTERASE UR QL STRIP: ABNORMAL
MICROSCOPIC COMMENT: NORMAL
NITRITE UR QL STRIP: NEGATIVE
PH UR STRIP: 5 [PH] (ref 5–8)
PROT UR QL STRIP: NEGATIVE
RBC #/AREA URNS HPF: 0 /HPF (ref 0–4)
SP GR UR STRIP: <1.005 (ref 1–1.03)
SQUAMOUS #/AREA URNS HPF: 1 /HPF
URN SPEC COLLECT METH UR: ABNORMAL
UROBILINOGEN UR STRIP-ACNC: NEGATIVE EU/DL
WBC #/AREA URNS HPF: 1 /HPF (ref 0–5)

## 2024-05-23 PROCEDURE — 81000 URINALYSIS NONAUTO W/SCOPE: CPT | Performed by: INTERNAL MEDICINE

## 2024-05-23 PROCEDURE — 87591 N.GONORRHOEAE DNA AMP PROB: CPT | Performed by: INTERNAL MEDICINE

## 2024-05-23 PROCEDURE — 87491 CHLMYD TRACH DNA AMP PROBE: CPT | Performed by: INTERNAL MEDICINE

## 2024-05-24 LAB
C TRACH DNA SPEC QL NAA+PROBE: NOT DETECTED
N GONORRHOEA DNA SPEC QL NAA+PROBE: NOT DETECTED

## 2024-06-04 ENCOUNTER — HOSPITAL ENCOUNTER (OUTPATIENT)
Dept: RADIOLOGY | Facility: HOSPITAL | Age: 24
Discharge: HOME | End: 2024-06-04
Attending: PSYCHIATRY & NEUROLOGY
Payer: OTHER GOVERNMENT

## 2024-06-04 DIAGNOSIS — M54.2 CERVICALGIA: ICD-10-CM

## 2024-06-04 PROCEDURE — 72141 MRI NECK SPINE W/O DYE: CPT | Mod: TC

## 2024-06-04 PROCEDURE — 72141 MRI NECK SPINE W/O DYE: CPT | Mod: 26,,, | Performed by: RADIOLOGY

## 2024-08-06 ENCOUNTER — OFFICE VISIT (OUTPATIENT)
Dept: NEUROLOGY | Facility: CLINIC | Age: 24
End: 2024-08-06
Payer: OTHER GOVERNMENT

## 2024-08-06 DIAGNOSIS — M54.2 CERVICALGIA: Primary | ICD-10-CM

## 2024-08-06 DIAGNOSIS — R51.9 PRESSURE IN HEAD: ICD-10-CM

## 2024-08-06 DIAGNOSIS — R42 DIZZINESS: ICD-10-CM

## 2024-08-06 PROCEDURE — 99214 OFFICE O/P EST MOD 30 MIN: CPT | Mod: 95,,, | Performed by: PSYCHIATRY & NEUROLOGY

## 2024-08-06 RX ORDER — AMOXICILLIN 500 MG/1
500 CAPSULE ORAL EVERY 8 HOURS
COMMUNITY
Start: 2024-07-26

## 2024-10-01 ENCOUNTER — PATIENT MESSAGE (OUTPATIENT)
Dept: INTERNAL MEDICINE | Facility: CLINIC | Age: 24
End: 2024-10-01
Payer: OTHER GOVERNMENT

## 2025-05-19 ENCOUNTER — PATIENT OUTREACH (OUTPATIENT)
Dept: ADMINISTRATIVE | Facility: HOSPITAL | Age: 25
End: 2025-05-19

## 2025-07-09 ENCOUNTER — PATIENT OUTREACH (OUTPATIENT)
Dept: ADMINISTRATIVE | Facility: HOSPITAL | Age: 25
End: 2025-07-09